# Patient Record
Sex: FEMALE | Race: WHITE | NOT HISPANIC OR LATINO | Employment: UNEMPLOYED | URBAN - NONMETROPOLITAN AREA
[De-identification: names, ages, dates, MRNs, and addresses within clinical notes are randomized per-mention and may not be internally consistent; named-entity substitution may affect disease eponyms.]

---

## 2018-02-16 ENCOUNTER — HOSPITAL ENCOUNTER (INPATIENT)
Facility: HOSPITAL | Age: 58
LOS: 7 days | Discharge: HOME | End: 2018-02-23
Attending: PSYCHIATRY & NEUROLOGY | Admitting: PSYCHIATRY & NEUROLOGY

## 2018-02-16 ENCOUNTER — HOSPITAL ENCOUNTER (EMERGENCY)
Facility: HOSPITAL | Age: 58
Discharge: STILL ADMITTED OR ADMINISTRATIVE | End: 2018-02-16
Attending: EMERGENCY MEDICINE

## 2018-02-16 VITALS
HEIGHT: 66 IN | BODY MASS INDEX: 36.96 KG/M2 | WEIGHT: 230 LBS | SYSTOLIC BLOOD PRESSURE: 128 MMHG | TEMPERATURE: 98.9 F | OXYGEN SATURATION: 99 % | DIASTOLIC BLOOD PRESSURE: 76 MMHG | RESPIRATION RATE: 16 BRPM | HEART RATE: 88 BPM

## 2018-02-16 DIAGNOSIS — F19.10 SUBSTANCE ABUSE (HCC): ICD-10-CM

## 2018-02-16 DIAGNOSIS — R45.851 DEPRESSION WITH SUICIDAL IDEATION: Primary | ICD-10-CM

## 2018-02-16 DIAGNOSIS — F32.A DEPRESSION WITH SUICIDAL IDEATION: Primary | ICD-10-CM

## 2018-02-16 LAB
6-ACETYL MORPHINE: NEGATIVE
ALBUMIN SERPL-MCNC: 4.7 G/DL (ref 3.5–5)
ALBUMIN/GLOB SERPL: 1.2 G/DL (ref 1.5–2.5)
ALP SERPL-CCNC: 83 U/L (ref 35–104)
ALT SERPL W P-5'-P-CCNC: 12 U/L (ref 10–36)
AMPHET+METHAMPHET UR QL: POSITIVE
ANION GAP SERPL CALCULATED.3IONS-SCNC: 5.2 MMOL/L (ref 3.6–11.2)
AST SERPL-CCNC: 17 U/L (ref 10–30)
B-HCG UR QL: NEGATIVE
BACTERIA UR QL AUTO: ABNORMAL /HPF
BARBITURATES UR QL SCN: NEGATIVE
BASOPHILS # BLD AUTO: 0.09 10*3/MM3 (ref 0–0.3)
BASOPHILS NFR BLD AUTO: 0.9 % (ref 0–2)
BENZODIAZ UR QL SCN: NEGATIVE
BILIRUB SERPL-MCNC: 0.3 MG/DL (ref 0.2–1.8)
BILIRUB UR QL STRIP: NEGATIVE
BUN BLD-MCNC: 15 MG/DL (ref 7–21)
BUN/CREAT SERPL: 13.5 (ref 7–25)
BUPRENORPHINE SERPL-MCNC: NEGATIVE NG/ML
CALCIUM SPEC-SCNC: 10.3 MG/DL (ref 7.7–10)
CANNABINOIDS SERPL QL: NEGATIVE
CHLORIDE SERPL-SCNC: 102 MMOL/L (ref 99–112)
CLARITY UR: CLEAR
CO2 SERPL-SCNC: 30.8 MMOL/L (ref 24.3–31.9)
COCAINE UR QL: NEGATIVE
COLOR UR: YELLOW
CREAT BLD-MCNC: 1.11 MG/DL (ref 0.43–1.29)
DEPRECATED RDW RBC AUTO: 42.2 FL (ref 37–54)
EOSINOPHIL # BLD AUTO: 0.47 10*3/MM3 (ref 0–0.7)
EOSINOPHIL NFR BLD AUTO: 4.6 % (ref 0–5)
ERYTHROCYTE [DISTWIDTH] IN BLOOD BY AUTOMATED COUNT: 13.7 % (ref 11.5–14.5)
ETHANOL BLD-MCNC: <10 MG/DL
ETHANOL UR QL: <0.01 %
GFR SERPL CREATININE-BSD FRML MDRD: 51 ML/MIN/1.73
GLOBULIN UR ELPH-MCNC: 4 GM/DL
GLUCOSE BLD-MCNC: 93 MG/DL (ref 70–110)
GLUCOSE UR STRIP-MCNC: NEGATIVE MG/DL
HCT VFR BLD AUTO: 40.2 % (ref 37–47)
HGB BLD-MCNC: 13.1 G/DL (ref 12–16)
HGB UR QL STRIP.AUTO: NEGATIVE
HYALINE CASTS UR QL AUTO: ABNORMAL /LPF
IMM GRANULOCYTES # BLD: 0.02 10*3/MM3 (ref 0–0.03)
IMM GRANULOCYTES NFR BLD: 0.2 % (ref 0–0.5)
KETONES UR QL STRIP: NEGATIVE
LEUKOCYTE ESTERASE UR QL STRIP.AUTO: ABNORMAL
LYMPHOCYTES # BLD AUTO: 3.05 10*3/MM3 (ref 1–3)
LYMPHOCYTES NFR BLD AUTO: 30 % (ref 21–51)
MAGNESIUM SERPL-MCNC: 2.1 MG/DL (ref 1.7–2.6)
MCH RBC QN AUTO: 28.1 PG (ref 27–33)
MCHC RBC AUTO-ENTMCNC: 32.6 G/DL (ref 33–37)
MCV RBC AUTO: 86.1 FL (ref 80–94)
METHADONE UR QL SCN: NEGATIVE
MONOCYTES # BLD AUTO: 0.76 10*3/MM3 (ref 0.1–0.9)
MONOCYTES NFR BLD AUTO: 7.5 % (ref 0–10)
NEUTROPHILS # BLD AUTO: 5.76 10*3/MM3 (ref 1.4–6.5)
NEUTROPHILS NFR BLD AUTO: 56.8 % (ref 30–70)
NITRITE UR QL STRIP: NEGATIVE
OPIATES UR QL: NEGATIVE
OSMOLALITY SERPL CALC.SUM OF ELEC: 276.2 MOSM/KG (ref 273–305)
OXYCODONE UR QL SCN: NEGATIVE
PCP UR QL SCN: NEGATIVE
PH UR STRIP.AUTO: 6 [PH] (ref 5–8)
PLATELET # BLD AUTO: 325 10*3/MM3 (ref 130–400)
PMV BLD AUTO: 8.8 FL (ref 6–10)
POTASSIUM BLD-SCNC: 4.6 MMOL/L (ref 3.5–5.3)
PROT SERPL-MCNC: 8.7 G/DL (ref 6–8)
PROT UR QL STRIP: NEGATIVE
RBC # BLD AUTO: 4.67 10*6/MM3 (ref 4.2–5.4)
RBC # UR: ABNORMAL /HPF
REF LAB TEST METHOD: ABNORMAL
SODIUM BLD-SCNC: 138 MMOL/L (ref 135–153)
SP GR UR STRIP: 1.01 (ref 1–1.03)
SQUAMOUS #/AREA URNS HPF: ABNORMAL /HPF
UROBILINOGEN UR QL STRIP: ABNORMAL
WBC NRBC COR # BLD: 10.15 10*3/MM3 (ref 4.5–12.5)
WBC UR QL AUTO: ABNORMAL /HPF

## 2018-02-16 PROCEDURE — 80307 DRUG TEST PRSMV CHEM ANLYZR: CPT | Performed by: PHYSICIAN ASSISTANT

## 2018-02-16 PROCEDURE — 81001 URINALYSIS AUTO W/SCOPE: CPT | Performed by: PHYSICIAN ASSISTANT

## 2018-02-16 PROCEDURE — 80053 COMPREHEN METABOLIC PANEL: CPT | Performed by: PHYSICIAN ASSISTANT

## 2018-02-16 PROCEDURE — 87086 URINE CULTURE/COLONY COUNT: CPT | Performed by: PHYSICIAN ASSISTANT

## 2018-02-16 PROCEDURE — 85025 COMPLETE CBC W/AUTO DIFF WBC: CPT | Performed by: PHYSICIAN ASSISTANT

## 2018-02-16 PROCEDURE — HZ2ZZZZ DETOXIFICATION SERVICES FOR SUBSTANCE ABUSE TREATMENT: ICD-10-PCS | Performed by: PSYCHIATRY & NEUROLOGY

## 2018-02-16 PROCEDURE — 83735 ASSAY OF MAGNESIUM: CPT | Performed by: PHYSICIAN ASSISTANT

## 2018-02-16 PROCEDURE — 81025 URINE PREGNANCY TEST: CPT | Performed by: PHYSICIAN ASSISTANT

## 2018-02-16 RX ORDER — FAMOTIDINE 20 MG/1
20 TABLET, FILM COATED ORAL 2 TIMES DAILY
Status: DISCONTINUED | OUTPATIENT
Start: 2018-02-16 | End: 2018-02-23 | Stop reason: HOSPADM

## 2018-02-16 RX ORDER — GABAPENTIN 400 MG/1
800 CAPSULE ORAL EVERY 6 HOURS SCHEDULED
Status: DISCONTINUED | OUTPATIENT
Start: 2018-02-16 | End: 2018-02-19

## 2018-02-16 RX ORDER — MULTIVITAMIN
1 TABLET ORAL DAILY
Status: CANCELLED | OUTPATIENT
Start: 2018-02-17

## 2018-02-16 RX ORDER — RANITIDINE 150 MG/1
300 TABLET ORAL 2 TIMES DAILY
COMMUNITY

## 2018-02-16 RX ORDER — IBUPROFEN 800 MG/1
800 TABLET ORAL EVERY 6 HOURS PRN
Status: DISCONTINUED | OUTPATIENT
Start: 2018-02-16 | End: 2018-02-23 | Stop reason: HOSPADM

## 2018-02-16 RX ORDER — IBUPROFEN 800 MG/1
800 TABLET ORAL EVERY 6 HOURS PRN
COMMUNITY
End: 2018-02-23 | Stop reason: HOSPADM

## 2018-02-16 RX ORDER — GABAPENTIN 800 MG/1
800 TABLET ORAL 4 TIMES DAILY
COMMUNITY
End: 2018-02-23 | Stop reason: HOSPADM

## 2018-02-16 RX ORDER — SERTRALINE HYDROCHLORIDE 100 MG/1
100 TABLET, FILM COATED ORAL DAILY
COMMUNITY
End: 2018-02-23 | Stop reason: HOSPADM

## 2018-02-16 RX ORDER — NICOTINE 21 MG/24HR
1 PATCH, TRANSDERMAL 24 HOURS TRANSDERMAL EVERY 24 HOURS
Status: CANCELLED | OUTPATIENT
Start: 2018-02-16

## 2018-02-16 RX ORDER — ONDANSETRON 4 MG/1
4 TABLET, FILM COATED ORAL EVERY 8 HOURS PRN
COMMUNITY
End: 2018-02-23 | Stop reason: HOSPADM

## 2018-02-16 RX ORDER — THIAMINE HCL 50 MG
100 TABLET ORAL DAILY
Status: CANCELLED | OUTPATIENT
Start: 2018-02-17

## 2018-02-16 RX ORDER — ONDANSETRON 4 MG/1
4 TABLET, FILM COATED ORAL EVERY 8 HOURS PRN
Status: DISCONTINUED | OUTPATIENT
Start: 2018-02-16 | End: 2018-02-23 | Stop reason: HOSPADM

## 2018-02-16 NOTE — DISCHARGE INSTRUCTIONS
"    Hypertension  Hypertension, commonly called high blood pressure, is when the force of blood pumping through the arteries is too strong. The arteries are the blood vessels that carry blood from the heart throughout the body. Hypertension forces the heart to work harder to pump blood and may cause arteries to become narrow or stiff. Having untreated or uncontrolled hypertension can cause heart attacks, strokes, kidney disease, and other problems.  A blood pressure reading consists of a higher number over a lower number. Ideally, your blood pressure should be below 120/80. The first (\"top\") number is called the systolic pressure. It is a measure of the pressure in your arteries as your heart beats. The second (\"bottom\") number is called the diastolic pressure. It is a measure of the pressure in your arteries as the heart relaxes.  What are the causes?  The cause of this condition is not known.  What increases the risk?  Some risk factors for high blood pressure are under your control. Others are not.  Factors you can change   · Smoking.  · Having type 2 diabetes mellitus, high cholesterol, or both.  · Not getting enough exercise or physical activity.  · Being overweight.  · Having too much fat, sugar, calories, or salt (sodium) in your diet.  · Drinking too much alcohol.  Factors that are difficult or impossible to change   · Having chronic kidney disease.  · Having a family history of high blood pressure.  · Age. Risk increases with age.  · Race. You may be at higher risk if you are -American.  · Gender. Men are at higher risk than women before age 45. After age 65, women are at higher risk than men.  · Having obstructive sleep apnea.  · Stress.  What are the signs or symptoms?  Extremely high blood pressure (hypertensive crisis) may cause:  · Headache.  · Anxiety.  · Shortness of breath.  · Nosebleed.  · Nausea and vomiting.  · Severe chest pain.  · Jerky movements you cannot control (seizures).  How is " this diagnosed?  This condition is diagnosed by measuring your blood pressure while you are seated, with your arm resting on a surface. The cuff of the blood pressure monitor will be placed directly against the skin of your upper arm at the level of your heart. It should be measured at least twice using the same arm. Certain conditions can cause a difference in blood pressure between your right and left arms.  Certain factors can cause blood pressure readings to be lower or higher than normal (elevated) for a short period of time:  · When your blood pressure is higher when you are in a health care provider's office than when you are at home, this is called white coat hypertension. Most people with this condition do not need medicines.  · When your blood pressure is higher at home than when you are in a health care provider's office, this is called masked hypertension. Most people with this condition may need medicines to control blood pressure.  If you have a high blood pressure reading during one visit or you have normal blood pressure with other risk factors:  · You may be asked to return on a different day to have your blood pressure checked again.  · You may be asked to monitor your blood pressure at home for 1 week or longer.  If you are diagnosed with hypertension, you may have other blood or imaging tests to help your health care provider understand your overall risk for other conditions.  How is this treated?  This condition is treated by making healthy lifestyle changes, such as eating healthy foods, exercising more, and reducing your alcohol intake. Your health care provider may prescribe medicine if lifestyle changes are not enough to get your blood pressure under control, and if:  · Your systolic blood pressure is above 130.  · Your diastolic blood pressure is above 80.  Your personal target blood pressure may vary depending on your medical conditions, your age, and other factors.  Follow these  instructions at home:  Eating and drinking   · Eat a diet that is high in fiber and potassium, and low in sodium, added sugar, and fat. An example eating plan is called the DASH (Dietary Approaches to Stop Hypertension) diet. To eat this way:  ¨ Eat plenty of fresh fruits and vegetables. Try to fill half of your plate at each meal with fruits and vegetables.  ¨ Eat whole grains, such as whole wheat pasta, brown rice, or whole grain bread. Fill about one quarter of your plate with whole grains.  ¨ Eat or drink low-fat dairy products, such as skim milk or low-fat yogurt.  ¨ Avoid fatty cuts of meat, processed or cured meats, and poultry with skin. Fill about one quarter of your plate with lean proteins, such as fish, chicken without skin, beans, eggs, and tofu.  ¨ Avoid premade and processed foods. These tend to be higher in sodium, added sugar, and fat.  · Reduce your daily sodium intake. Most people with hypertension should eat less than 1,500 mg of sodium a day.  · Limit alcohol intake to no more than 1 drink a day for nonpregnant women and 2 drinks a day for men. One drink equals 12 oz of beer, 5 oz of wine, or 1½ oz of hard liquor.  Lifestyle   · Work with your health care provider to maintain a healthy body weight or to lose weight. Ask what an ideal weight is for you.  · Get at least 30 minutes of exercise that causes your heart to beat faster (aerobic exercise) most days of the week. Activities may include walking, swimming, or biking.  · Include exercise to strengthen your muscles (resistance exercise), such as pilates or lifting weights, as part of your weekly exercise routine. Try to do these types of exercises for 30 minutes at least 3 days a week.  · Do not use any products that contain nicotine or tobacco, such as cigarettes and e-cigarettes. If you need help quitting, ask your health care provider.  · Monitor your blood pressure at home as told by your health care provider.  · Keep all follow-up visits  as told by your health care provider. This is important.  Medicines   · Take over-the-counter and prescription medicines only as told by your health care provider. Follow directions carefully. Blood pressure medicines must be taken as prescribed.  · Do not skip doses of blood pressure medicine. Doing this puts you at risk for problems and can make the medicine less effective.  · Ask your health care provider about side effects or reactions to medicines that you should watch for.  Contact a health care provider if:  · You think you are having a reaction to a medicine you are taking.  · You have headaches that keep coming back (recurring).  · You feel dizzy.  · You have swelling in your ankles.  · You have trouble with your vision.  Get help right away if:  · You develop a severe headache or confusion.  · You have unusual weakness or numbness.  · You feel faint.  · You have severe pain in your chest or abdomen.  · You vomit repeatedly.  · You have trouble breathing.  Summary  · Hypertension is when the force of blood pumping through your arteries is too strong. If this condition is not controlled, it may put you at risk for serious complications.  · Your personal target blood pressure may vary depending on your medical conditions, your age, and other factors. For most people, a normal blood pressure is less than 120/80.  · Hypertension is treated with lifestyle changes, medicines, or a combination of both. Lifestyle changes include weight loss, eating a healthy, low-sodium diet, exercising more, and limiting alcohol.  This information is not intended to replace advice given to you by your health care provider. Make sure you discuss any questions you have with your health care provider.  Document Released: 12/18/2006 Document Revised: 11/15/2017 Document Reviewed: 11/15/2017  [employer] Interactive Patient Education © 2017 [employer]

## 2018-02-16 NOTE — ED PROVIDER NOTES
Subjective   Patient is a 57 y.o. female presenting with mental health disorder.   Mental Health Problem   Presenting symptoms: depression and suicidal thoughts    Degree of incapacity (severity):  Severe  Onset quality:  Gradual  Duration:  2 months  Timing:  Constant  Progression:  Worsening  Chronicity:  Recurrent  Context: alcohol use and drug abuse (IV methamphetamine and cocaine)    Relieved by:  Nothing  Worsened by:  Nothing  Associated symptoms: anxiety and feelings of worthlessness    Associated symptoms: no abdominal pain and no chest pain        Review of Systems   Constitutional: Negative.  Negative for fever.   HENT: Negative.    Respiratory: Negative.    Cardiovascular: Negative.  Negative for chest pain.   Gastrointestinal: Negative.  Negative for abdominal pain.   Endocrine: Negative.    Genitourinary: Negative.  Negative for dysuria.   Skin: Negative.    Neurological: Negative.    Psychiatric/Behavioral: Positive for suicidal ideas. The patient is nervous/anxious.    All other systems reviewed and are negative.      Past Medical History:   Diagnosis Date   • Alcoholism    • Anxiety    • Depression    • Substance abuse        No Known Allergies    Past Surgical History:   Procedure Laterality Date   • BACK SURGERY         Family History   Problem Relation Age of Onset   • Schizophrenia Sister        Social History     Social History   • Marital status:      Spouse name: N/A   • Number of children: N/A   • Years of education: N/A     Social History Main Topics   • Smoking status: Never Smoker   • Smokeless tobacco: Never Used   • Alcohol use Yes      Comment: 5th to half of gallon daily.Reports that she may drink every day for 2 weeks and then not drink for a month   • Drug use: Yes     Special: Hydrocodone, Methamphetamines, Cocaine   • Sexual activity: Defer     Other Topics Concern   • None     Social History Narrative   • None           Objective   Physical Exam   Constitutional: She is  oriented to person, place, and time. She appears well-developed and well-nourished. No distress.   HENT:   Head: Normocephalic and atraumatic.   Right Ear: External ear normal.   Left Ear: External ear normal.   Nose: Nose normal.   Eyes: Conjunctivae and EOM are normal. Pupils are equal, round, and reactive to light.   Neck: Normal range of motion. Neck supple. No JVD present. No tracheal deviation present.   Cardiovascular: Normal rate, regular rhythm and normal heart sounds.    No murmur heard.  Pulmonary/Chest: Effort normal and breath sounds normal. No respiratory distress. She has no wheezes.   Abdominal: Soft. Bowel sounds are normal. There is no tenderness.   Musculoskeletal: Normal range of motion. She exhibits no edema or deformity.   Neurological: She is alert and oriented to person, place, and time. No cranial nerve deficit.   Skin: Skin is warm and dry. No rash noted. She is not diaphoretic. No erythema. No pallor.   Psychiatric: She has a normal mood and affect. Her behavior is normal. Thought content normal.   Nursing note and vitals reviewed.      Procedures         ED Course  ED Course                  MDM  Number of Diagnoses or Management Options  Depression with suicidal ideation: established and worsening      Final diagnoses:   Depression with suicidal ideation   Substance abuse            CARLTON Leggett  02/16/18 1814       CARLTON Leggett  02/16/18 1957

## 2018-02-17 PROBLEM — R45.851 SUICIDAL IDEATIONS: Status: ACTIVE | Noted: 2018-02-17

## 2018-02-17 LAB
HAV IGM SERPL QL IA: ABNORMAL
HBV CORE IGM SERPL QL IA: ABNORMAL
HBV SURFACE AG SERPL QL IA: ABNORMAL
HCV AB SER DONR QL: REACTIVE

## 2018-02-17 PROCEDURE — 93010 ELECTROCARDIOGRAM REPORT: CPT | Performed by: INTERNAL MEDICINE

## 2018-02-17 PROCEDURE — 80074 ACUTE HEPATITIS PANEL: CPT | Performed by: PSYCHIATRY & NEUROLOGY

## 2018-02-17 PROCEDURE — G0008 ADMIN INFLUENZA VIRUS VAC: HCPCS | Performed by: PSYCHIATRY & NEUROLOGY

## 2018-02-17 PROCEDURE — 25010000002 INFLUENZA VAC SPLIT QUAD 0.5 ML SUSPENSION PREFILLED SYRINGE: Performed by: PSYCHIATRY & NEUROLOGY

## 2018-02-17 PROCEDURE — 90686 IIV4 VACC NO PRSV 0.5 ML IM: CPT | Performed by: PSYCHIATRY & NEUROLOGY

## 2018-02-17 PROCEDURE — 99221 1ST HOSP IP/OBS SF/LOW 40: CPT | Performed by: PSYCHIATRY & NEUROLOGY

## 2018-02-17 PROCEDURE — 93005 ELECTROCARDIOGRAM TRACING: CPT | Performed by: PSYCHIATRY & NEUROLOGY

## 2018-02-17 RX ORDER — ALUMINA, MAGNESIA, AND SIMETHICONE 2400; 2400; 240 MG/30ML; MG/30ML; MG/30ML
15 SUSPENSION ORAL EVERY 6 HOURS PRN
Status: DISCONTINUED | OUTPATIENT
Start: 2018-02-17 | End: 2018-02-23 | Stop reason: HOSPADM

## 2018-02-17 RX ORDER — LOPERAMIDE HYDROCHLORIDE 2 MG/1
2 CAPSULE ORAL 4 TIMES DAILY PRN
Status: DISCONTINUED | OUTPATIENT
Start: 2018-02-17 | End: 2018-02-23 | Stop reason: HOSPADM

## 2018-02-17 RX ORDER — ACETAMINOPHEN 160 MG/5ML
650 SOLUTION ORAL EVERY 4 HOURS PRN
Status: DISCONTINUED | OUTPATIENT
Start: 2018-02-17 | End: 2018-02-23 | Stop reason: HOSPADM

## 2018-02-17 RX ORDER — TRAZODONE HYDROCHLORIDE 50 MG/1
50 TABLET ORAL NIGHTLY PRN
Status: DISCONTINUED | OUTPATIENT
Start: 2018-02-17 | End: 2018-02-23 | Stop reason: HOSPADM

## 2018-02-17 RX ORDER — BENZTROPINE MESYLATE 1 MG/ML
0.5 INJECTION INTRAMUSCULAR; INTRAVENOUS DAILY PRN
Status: DISCONTINUED | OUTPATIENT
Start: 2018-02-17 | End: 2018-02-23 | Stop reason: HOSPADM

## 2018-02-17 RX ORDER — FAMOTIDINE 20 MG/1
20 TABLET, FILM COATED ORAL 2 TIMES DAILY PRN
Status: DISCONTINUED | OUTPATIENT
Start: 2018-02-17 | End: 2018-02-23 | Stop reason: HOSPADM

## 2018-02-17 RX ORDER — ECHINACEA PURPUREA EXTRACT 125 MG
2 TABLET ORAL AS NEEDED
Status: DISCONTINUED | OUTPATIENT
Start: 2018-02-17 | End: 2018-02-23 | Stop reason: HOSPADM

## 2018-02-17 RX ORDER — HYDROXYZINE 50 MG/1
50 TABLET, FILM COATED ORAL EVERY 6 HOURS PRN
Status: DISCONTINUED | OUTPATIENT
Start: 2018-02-17 | End: 2018-02-23 | Stop reason: HOSPADM

## 2018-02-17 RX ORDER — NITROFURANTOIN MACROCRYSTALS 50 MG/1
100 CAPSULE ORAL 2 TIMES DAILY
Status: DISCONTINUED | OUTPATIENT
Start: 2018-02-17 | End: 2018-02-23 | Stop reason: HOSPADM

## 2018-02-17 RX ORDER — ONDANSETRON 4 MG/1
4 TABLET, FILM COATED ORAL EVERY 6 HOURS PRN
Status: DISCONTINUED | OUTPATIENT
Start: 2018-02-17 | End: 2018-02-23 | Stop reason: HOSPADM

## 2018-02-17 RX ORDER — BENZTROPINE MESYLATE 1 MG/1
1 TABLET ORAL DAILY PRN
Status: DISCONTINUED | OUTPATIENT
Start: 2018-02-17 | End: 2018-02-23 | Stop reason: HOSPADM

## 2018-02-17 RX ORDER — BENZONATATE 100 MG/1
100 CAPSULE ORAL 3 TIMES DAILY PRN
Status: DISCONTINUED | OUTPATIENT
Start: 2018-02-17 | End: 2018-02-23 | Stop reason: HOSPADM

## 2018-02-17 RX ADMIN — IBUPROFEN 800 MG: 800 TABLET ORAL at 17:55

## 2018-02-17 RX ADMIN — NITROFURANTOIN MACROCRYSTALS 100 MG: 50 CAPSULE ORAL at 21:15

## 2018-02-17 RX ADMIN — INFLUENZA VIRUS VACCINE 0.5 ML: 15; 15; 15; 15 SUSPENSION INTRAMUSCULAR at 11:47

## 2018-02-17 RX ADMIN — GABAPENTIN 800 MG: 400 CAPSULE ORAL at 06:27

## 2018-02-17 RX ADMIN — SERTRALINE 100 MG: 50 TABLET, FILM COATED ORAL at 08:58

## 2018-02-17 RX ADMIN — NITROFURANTOIN MACROCRYSTALS 100 MG: 50 CAPSULE ORAL at 11:47

## 2018-02-17 RX ADMIN — FAMOTIDINE 20 MG: 20 TABLET, FILM COATED ORAL at 08:58

## 2018-02-17 RX ADMIN — GABAPENTIN 800 MG: 400 CAPSULE ORAL at 17:55

## 2018-02-17 RX ADMIN — GABAPENTIN 800 MG: 400 CAPSULE ORAL at 22:27

## 2018-02-17 RX ADMIN — FAMOTIDINE 20 MG: 20 TABLET, FILM COATED ORAL at 21:15

## 2018-02-17 RX ADMIN — GABAPENTIN 800 MG: 400 CAPSULE ORAL at 11:47
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
## 2018-02-17 NOTE — PLAN OF CARE
Problem: BH Patient Care Overview (Adult)  Goal: Plan of Care Review  Outcome: Ongoing (interventions implemented as appropriate)    Goal: Interdisciplinary Rounds/Family Conference  Outcome: Ongoing (interventions implemented as appropriate)    Goal: Individualization and Mutuality  Outcome: Ongoing (interventions implemented as appropriate)    Goal: Discharge Needs Assessment  Outcome: Ongoing (interventions implemented as appropriate)      Problem:  Overarching Goals  Goal: Adheres to Safety Considerations for Self and Others  Outcome: Ongoing (interventions implemented as appropriate)    Goal: Optimized Coping Skills in Response to Life Stressors  Outcome: Ongoing (interventions implemented as appropriate)    Goal: Develops/Participates in Therapeutic [place] to Support Successful Transition  Outcome: Ongoing (interventions implemented as appropriate)      Problem: Risk for Self Injury/Neglect  Goal: Refrain from harming self  Outcome: Ongoing (interventions implemented as appropriate)

## 2018-02-17 NOTE — NURSING NOTE
Presented clinicals Dr. Chavez. Orders for admission with routine orders and SP3 precautions. Orders read back and verified.

## 2018-02-17 NOTE — PLAN OF CARE
Problem:  Patient Care Overview (Adult)  Goal: Plan of Care Review   02/17/18 0310   Coping/Psychosocial Response Interventions   Plan Of Care Reviewed With patient   Coping/Psychosocial   Patient Agreement with Plan of Care agrees   Patient Care Overview   Progress no change   Outcome Evaluation   Outcome Summary/Follow up Plan pt presented to floor, depressed and sad affect, pt went to bed, in room resting

## 2018-02-18 PROCEDURE — 99231 SBSQ HOSP IP/OBS SF/LOW 25: CPT | Performed by: PSYCHIATRY & NEUROLOGY

## 2018-02-18 RX ADMIN — GABAPENTIN 800 MG: 400 CAPSULE ORAL at 23:22

## 2018-02-18 RX ADMIN — SERTRALINE 100 MG: 50 TABLET, FILM COATED ORAL at 08:30

## 2018-02-18 RX ADMIN — NITROFURANTOIN MACROCRYSTALS 100 MG: 50 CAPSULE ORAL at 08:30

## 2018-02-18 RX ADMIN — GABAPENTIN 800 MG: 400 CAPSULE ORAL at 17:09

## 2018-02-18 RX ADMIN — FAMOTIDINE 20 MG: 20 TABLET, FILM COATED ORAL at 08:30

## 2018-02-18 RX ADMIN — GABAPENTIN 800 MG: 400 CAPSULE ORAL at 11:51

## 2018-02-18 RX ADMIN — TRAZODONE HYDROCHLORIDE 50 MG: 50 TABLET ORAL at 21:01

## 2018-02-18 RX ADMIN — FAMOTIDINE 20 MG: 20 TABLET, FILM COATED ORAL at 21:00

## 2018-02-18 RX ADMIN — IBUPROFEN 800 MG: 800 TABLET ORAL at 08:30

## 2018-02-18 RX ADMIN — IBUPROFEN 800 MG: 800 TABLET ORAL at 21:01

## 2018-02-18 RX ADMIN — NITROFURANTOIN MACROCRYSTALS 100 MG: 50 CAPSULE ORAL at 21:00

## 2018-02-18 RX ADMIN — GABAPENTIN 800 MG: 400 CAPSULE ORAL at 06:12

## 2018-02-18 NOTE — PLAN OF CARE
Problem: BH Patient Care Overview (Adult)  Goal: Plan of Care Review  Outcome: Ongoing (interventions implemented as appropriate)  Rates anxiety 8 and depression 8. Denies suicidal thoughts. Reports hearing voices.   Goal: Interdisciplinary Rounds/Family Conference  Outcome: Ongoing (interventions implemented as appropriate)    Goal: Individualization and Mutuality  Outcome: Ongoing (interventions implemented as appropriate)    Goal: Discharge Needs Assessment  Outcome: Ongoing (interventions implemented as appropriate)      Problem:  Overarching Goals  Goal: Adheres to Safety Considerations for Self and Others  Outcome: Ongoing (interventions implemented as appropriate)    Goal: Optimized Coping Skills in Response to Life Stressors  Outcome: Ongoing (interventions implemented as appropriate)    Goal: Develops/Participates in Therapeutic [place] to Support Successful Transition  Outcome: Ongoing (interventions implemented as appropriate)      Problem: Risk for Self Injury/Neglect  Goal: Refrain from harming self  Outcome: Ongoing (interventions implemented as appropriate)

## 2018-02-18 NOTE — PLAN OF CARE
Problem: BH Patient Care Overview (Adult)  Goal: Plan of Care Review  Outcome: Ongoing (interventions implemented as appropriate)   02/18/18 0649   Coping/Psychosocial Response Interventions   Plan Of Care Reviewed With patient   Coping/Psychosocial   Patient Agreement with Plan of Care agrees   Patient Care Overview   Progress no change       Problem:  Overarching Goals  Goal: Adheres to Safety Considerations for Self and Others  Outcome: Ongoing (interventions implemented as appropriate)    Goal: Optimized Coping Skills in Response to Life Stressors  Outcome: Ongoing (interventions implemented as appropriate)    Goal: Develops/Participates in Therapeutic [place] to Support Successful Transition  Outcome: Ongoing (interventions implemented as appropriate)

## 2018-02-19 PROBLEM — F10.20 ALCOHOLISM (HCC): Status: ACTIVE | Noted: 2018-02-19

## 2018-02-19 PROBLEM — F33.2 SEVERE EPISODE OF RECURRENT MAJOR DEPRESSIVE DISORDER, WITHOUT PSYCHOTIC FEATURES (HCC): Status: ACTIVE | Noted: 2018-02-19

## 2018-02-19 PROBLEM — G89.4 CHRONIC PAIN DISORDER: Status: ACTIVE | Noted: 2018-02-19

## 2018-02-19 PROBLEM — B18.2 HEP C W/O COMA, CHRONIC (HCC): Status: ACTIVE | Noted: 2018-02-19

## 2018-02-19 LAB — BACTERIA SPEC AEROBE CULT: NORMAL

## 2018-02-19 PROCEDURE — 99232 SBSQ HOSP IP/OBS MODERATE 35: CPT | Performed by: PSYCHIATRY & NEUROLOGY

## 2018-02-19 RX ORDER — GABAPENTIN 400 MG/1
800 CAPSULE ORAL 3 TIMES DAILY
Status: DISCONTINUED | OUTPATIENT
Start: 2018-02-19 | End: 2018-02-20

## 2018-02-19 RX ORDER — PANTOPRAZOLE SODIUM 40 MG/1
40 TABLET, DELAYED RELEASE ORAL
Status: DISCONTINUED | OUTPATIENT
Start: 2018-02-19 | End: 2018-02-23 | Stop reason: HOSPADM

## 2018-02-19 RX ADMIN — SERTRALINE 150 MG: 50 TABLET, FILM COATED ORAL at 09:56

## 2018-02-19 RX ADMIN — NITROFURANTOIN MACROCRYSTALS 100 MG: 50 CAPSULE ORAL at 09:56

## 2018-02-19 RX ADMIN — PANTOPRAZOLE SODIUM 40 MG: 40 TABLET, DELAYED RELEASE ORAL at 09:57

## 2018-02-19 RX ADMIN — FAMOTIDINE 20 MG: 20 TABLET, FILM COATED ORAL at 09:56

## 2018-02-19 RX ADMIN — FAMOTIDINE 20 MG: 20 TABLET, FILM COATED ORAL at 20:43

## 2018-02-19 RX ADMIN — GABAPENTIN 800 MG: 400 CAPSULE ORAL at 20:43

## 2018-02-19 RX ADMIN — GABAPENTIN 800 MG: 400 CAPSULE ORAL at 15:49

## 2018-02-19 RX ADMIN — IBUPROFEN 800 MG: 800 TABLET ORAL at 14:11

## 2018-02-19 RX ADMIN — HYDROXYZINE HYDROCHLORIDE 50 MG: 50 TABLET ORAL at 14:12

## 2018-02-19 RX ADMIN — GABAPENTIN 800 MG: 400 CAPSULE ORAL at 06:08

## 2018-02-19 RX ADMIN — NITROFURANTOIN MACROCRYSTALS 100 MG: 50 CAPSULE ORAL at 20:43

## 2018-02-19 NOTE — PROGRESS NOTES
"INPATIENT PSYCHIATRIC PROGRESS NOTE    Name:  Brittaney Park  :  1960  MRN:  [id number]  Visit Number:  [id number]  Length of stay:  3    Behavioral Health Treatment Plan and Problem List: I have reviewed and approved the Behavioral Health Treatment Plan and Problem list.    SUBJECTIVE  CC: \"Some better\"     INTERVAL HISTORY: remains depressed but with a trace of hope. No significant withdrawal symptoms although has been on 800 mg of Neurontin QID.     Had 2 years of sobriety and \"feeling good\" while living on the [facility] [id number]. Relapse when she returned to [facility] and living with her daughter. \"Got stressed\". Feels that a religous based residential recovery program would be helpful.     Depression rating 8/10  Anxiety rating 8/10  Sleep: 7-8 hours    Cravin-9/10, would free her from her worries.        Review of Systems   Respiratory: Negative.    Cardiovascular: Negative.    Gastrointestinal:        GERD   Musculoskeletal:        Chronic pain right leg and hip   Neurological: Negative.          OBJECTIVE    Temp:  [97.1 °F (36.2 °C)-97.9 °F (36.6 °C)] 97.1 °F (36.2 °C)  Heart Rate:  [67-76] 76  Resp:  [18] 18  BP: (144-151)/(76-81) 144/81    MENTAL STATUS EXAM:      Appearance:Casually dressed, good hygeine.   Cooperation:Cooperative  Psychomotor: No psychomotor agitation/retardation, No EPS, No motor tics  Speech-normal rate, amount.   Mood/Affect: Depressed  Thought Processes: associations intact  Thought Content: negativistic   Hallucination(s): none  Hopelessness: No  Optimistic:minimally  Suicidal Thoughts:  none  Suicidal Plan/Intent: none  Homicidal Thoughts:  absent  Orientation: oriented x 3  Memory: recent intact    Lab Results (last 24 hours)     ** No results found for the last 24 hours. **           Imaging Results (last 24 hours)     ** No results found for the last 24 hours. **           ECG/EMG Results (most recent)     Procedure Component Value Units " Date/Time    ECG 12 Lead [[id number]] Collected:  02/17/18 1343     Updated:  02/17/18 1344    Narrative:       Test Reason : Potential adverse reaction to medications.  Blood Pressure : **/** mmHG  Vent. Rate : 067 BPM     Atrial Rate : 067 BPM     P-R Int : 130 ms          QRS Dur : 076 ms      QT Int : 420 ms       P-R-T Axes : 036 -01 053 degrees     QTc Int : 443 ms    Normal sinus rhythm  Normal ECG  No previous ECGs available    Referred By:  LAURA           Confirmed By:            ALLERGIES: Review of patient's allergies indicates no known allergies.      Current Facility-Administered Medications:   •  acetaminophen (TYLENOL) 160 MG/5ML solution 650 mg, 650 mg, Oral, Q4H PRN, Alysha Chavez MD  •  aluminum-magnesium hydroxide-simethicone (MAALOX MAX) 400-400-40 MG/5ML suspension 15 mL, 15 mL, Oral, Q6H PRN, Alysha Chavez MD  •  benzonatate (TESSALON) capsule 100 mg, 100 mg, Oral, TID PRN, Alysha Chavez MD  •  benztropine (COGENTIN) tablet 1 mg, 1 mg, Oral, Daily PRN **OR** benztropine (COGENTIN) injection 0.5 mg, 0.5 mg, Intramuscular, Daily PRN, Alysha Chavez MD  •  famotidine (PEPCID) tablet 20 mg, 20 mg, Oral, BID PRN, Alysha Chavez MD  •  famotidine (PEPCID) tablet 20 mg, 20 mg, Oral, BID, Alysha Chavez MD, 20 mg at 02/18/18 2100  •  gabapentin (NEURONTIN) capsule 800 mg, 800 mg, Oral, Q6H, Alysha Chavez MD, 800 mg at 02/19/18 0608  •  hydrOXYzine (ATARAX) tablet 50 mg, 50 mg, Oral, Q6H PRN, Alysha Chavez MD  •  ibuprofen (ADVIL,MOTRIN) tablet 800 mg, 800 mg, Oral, Q6H PRN, Alysha Chavez MD, 800 mg at 02/18/18 2101  •  loperamide (IMODIUM) capsule 2 mg, 2 mg, Oral, 4x Daily PRN, Alysha Chavez MD  •  magnesium hydroxide (MILK OF MAGNESIA) suspension 2400 mg/10mL 10 mL, 10 mL, Oral, Daily PRN, Alysha Chavez MD  •  nitrofurantoin (MACRODANTIN) capsule 100 mg, 100 mg, Oral, BID, Alysha Chavez MD, 100 mg at 02/18/18 2100  •  ondansetron (ZOFRAN) tablet 4 mg, 4 mg, Oral, Q6H PRN, Alysha Chavez MD  •  ondansetron (ZOFRAN)  tablet 4 mg, 4 mg, Oral, Q8H PRN, Alysha Chavez MD  •  sertraline (ZOLOFT) tablet 100 mg, 100 mg, Oral, Daily, Alysha Chavez MD, 100 mg at 02/18/18 0830  •  sodium chloride (OCEAN) nasal spray 2 spray, 2 spray, Each Nare, PRN, Alysha Chavez MD  •  traZODone (DESYREL) tablet 50 mg, 50 mg, Oral, Nightly PRN, Alysha Chavez MD, 50 mg at 02/18/18 2101    ASSESSMENT & PLAN    MDD, recurrent, severe, without psychosis  -continue Zoloft 100mg QD, encouraged compliance, SP3 precautions, work on safety planning.       Polysubstance use disorder  -Encourage abstinence, comfort medications as required. Encouraged patient to discuss rehab treatment options with therapist.       UTI   -Macrobid 100mg BID for 7 days      Hepatitis C  -Patient stated she is aware of this diagnosis, will need follow up outpatient.       Neuropathy  -Continue home gabapentin, per pharmacy [facility] appropriate       GERD  -continue home pepcid      Suicide precautions: Suicide precaution Level 3 (q15 min checks)     Behavioral Health Treatment Plan and Problem List: I have reviewed and approved the Behavioral Health Treatment Plan and Problem list.    Clinician:  Delano Mckee MD  02/19/18  8:35 AM

## 2018-02-19 NOTE — PLAN OF CARE
Problem: BH Patient Care Overview (Adult)  Goal: Plan of Care Review  Outcome: Ongoing (interventions implemented as appropriate)  Pt. Slept 6 hours rates dep 6 anx.5 she verbalizes hearing voices putting her down laughing @ her she verbalizes  craving alcohol 8 minimal interaction noted with peers cooperative manner   02/19/18 9826   Coping/Psychosocial Response Interventions   Plan Of Care Reviewed With patient   Coping/Psychosocial   Patient Agreement with Plan of Care agrees   Patient Care Overview   Progress improving       Problem:  Overarching Goals  Goal: Adheres to Safety Considerations for Self and Others  Outcome: Ongoing (interventions implemented as appropriate)    Goal: Optimized Coping Skills in Response to Life Stressors  Outcome: Ongoing (interventions implemented as appropriate)    Goal: Develops/Participates in Therapeutic [place] to Support Successful Transition  Outcome: Ongoing (interventions implemented as appropriate)

## 2018-02-19 NOTE — PLAN OF CARE
Problem: BH Patient Care Overview (Adult)  Goal: Plan of Care Review  Outcome: Ongoing (interventions implemented as appropriate)  PATIENT VERBALIZES ANXIETY, DEPRESSION, AUDITORY HALLUCINATIONS AND PAIN. NO NEW ORDERS NOTED.  Goal: Interdisciplinary Rounds/Family Conference  Outcome: Ongoing (interventions implemented as appropriate)    Goal: Individualization and Mutuality  Outcome: Ongoing (interventions implemented as appropriate)    Goal: Discharge Needs Assessment  Outcome: Ongoing (interventions implemented as appropriate)      Problem:  Overarching Goals  Goal: Adheres to Safety Considerations for Self and Others  Outcome: Ongoing (interventions implemented as appropriate)    Goal: Optimized Coping Skills in Response to Life Stressors  Outcome: Ongoing (interventions implemented as appropriate)    Goal: Develops/Participates in Therapeutic [place] to Support Successful Transition  Outcome: Ongoing (interventions implemented as appropriate)      Problem: SUBSTANCE USE/ABUSE  Goal: By discharge, will develop insight into their chemical dependency and sustain motivation to continue in recovery.  Outcome: Ongoing (interventions implemented as appropriate)    Goal: By discharge, will have in place an ongoing treatment plan in collaboration with assigned CDP.  Outcome: Ongoing (interventions implemented as appropriate)
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
## 2018-02-19 NOTE — DISCHARGE PLACEMENT REQUEST
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 (ADVIL,MOTRIN) tablet 800 mg, 800 mg, Oral, Q6H PRN, Alysha Chavez MD, 800 mg at 02/18/18 2101  •  loperamide (IMODIUM) capsule 2 mg, 2 mg, Oral, 4x Daily PRN, Alysha Chavez MD  •  magnesium hydroxide (MILK OF MAGNESIA) suspension 2400 mg/10mL 10 mL, 10 mL, Oral, Daily PRN, Alysha Chavez MD  •  nitrofurantoin (MACRODANTIN) capsule 100 mg, 100 mg, Oral, BID, Alysha Chavez MD, 100 mg at 02/18/18 2100  •  ondansetron (ZOFRAN) tablet 4 mg, 4 mg, Oral, Q6H PRN, Alysha Chavez MD  •  ondansetron (ZOFRAN) tablet 4 mg, 4 mg, Oral, Q8H PRN, Alysha Chavez MD  •  sertraline (ZOLOFT) tablet 100 mg, 100 mg, Oral, Daily, Alysha Chavez MD, 100 mg at 02/18/18 0830  •  sodium chloride (OCEAN) nasal spray 2 spray, 2 spray, Each Nare, PRN, Alysha Chavez MD  •  traZODone (DESYREL) tablet 50 mg, 50 mg, Oral, Nightly PRN, Alysha Chavez MD, 50 mg at 02/18/18 2101    ASSESSMENT & PLAN    MDD, recurrent, severe, without psychosis  -continue Zoloft 100mg QD, encouraged compliance, SP3 precautions, work on safety planning.       Polysubstance use disorder  -Encourage abstinence, comfort medications as required. Encouraged patient to discuss rehab treatment options with therapist.       UTI   -Macrobid 100mg BID for 7 days      Hepatitis C  -Patient stated she is aware of  "this diagnosis, will need follow up outpatient.       Neuropathy  -Continue home gabapentin, per pharmacy [facility] appropriate       GERD  -continue home pepcid      Suicide precautions: Suicide precaution Level 3 (q15 min checks)     Behavioral Health Treatment Plan and Problem List: I have reviewed and approved the Behavioral Health Treatment Plan and Problem list.    Clinician:  Delano Mckee MD  18  8:35 AM     Electronically signed by Delano Mckee MD at 2018  1:24 PM      Delano Mckee MD at 2018  8:35 AM  Version 1 of 2         INPATIENT PSYCHIATRIC PROGRESS NOTE    Name:  Brittaney Park  :  1960  MRN:  [id number]  Visit Number:  [id number]  Length of stay:  3    Behavioral Health Treatment Plan and Problem List: I have reviewed and approved the Behavioral Health Treatment Plan and Problem list.    SUBJECTIVE  CC: \"Some better\"     INTERVAL HISTORY: remains depressed but with a trace of hope. No significant withdrawal symptoms although has been on 800 mg of Neurontin QID.     Had 2 years of sobriety and \"feeling good\" will living on the [facility] [id number]. Relapse when she returned to [facility] and living with her daughter. \"Got stressed\". Feels that a religous based residential recovery program would be helpful.     Depression rating 8/10  Anxiety rating 8/10  Sleep: 7-8 hours    Cravin-9/10, would free her from her worries.        Review of Systems   Respiratory: Negative.    Cardiovascular: Negative.    Gastrointestinal:        GERD   Musculoskeletal:        Chronic pain right leg and hip   Neurological: Negative.          OBJECTIVE    Temp:  [97.1 °F (36.2 °C)-97.9 °F (36.6 °C)] 97.1 °F (36.2 °C)  Heart Rate:  [67-76] 76  Resp:  [18] 18  BP: (144-151)/(76-81) 144/81    MENTAL STATUS EXAM:      Appearance:Casually dressed, good hygeine.   Cooperation:Cooperative  Psychomotor: No psychomotor agitation/retardation, No EPS, No " motor tics  Speech-normal rate, amount.   Mood/Affect: Depressed  Thought Processes: associations intact  Thought Content: negativistic   Hallucination(s): none  Hopelessness: No  Optimistic:minimally  Suicidal Thoughts:  none  Suicidal Plan/Intent: none  Homicidal Thoughts:  absent  Orientation: oriented x 3  Memory: recent intact    Lab Results (last 24 hours)     ** No results found for the last 24 hours. **           Imaging Results (last 24 hours)     ** No results found for the last 24 hours. **           ECG/EMG Results (most recent)     Procedure Component Value Units Date/Time    ECG 12 Lead [[id number]] Collected:  02/17/18 1343     Updated:  02/17/18 1344    Narrative:       Test Reason : Potential adverse reaction to medications.  Blood Pressure : **/** mmHG  Vent. Rate : 067 BPM     Atrial Rate : 067 BPM     P-R Int : 130 ms          QRS Dur : 076 ms      QT Int : 420 ms       P-R-T Axes : 036 -01 053 degrees     QTc Int : 443 ms    Normal sinus rhythm  Normal ECG  No previous ECGs available    Referred By:  LAURA           Confirmed By:            ALLERGIES: Review of patient's allergies indicates no known allergies.      Current Facility-Administered Medications:   •  acetaminophen (TYLENOL) 160 MG/5ML solution 650 mg, 650 mg, Oral, Q4H PRN, Alysha Chavez MD  •  aluminum-magnesium hydroxide-simethicone (MAALOX MAX) 400-400-40 MG/5ML suspension 15 mL, 15 mL, Oral, Q6H PRN, Alysha Chavez MD  •  benzonatate (TESSALON) capsule 100 mg, 100 mg, Oral, TID PRN, Alysha Chavez MD  •  benztropine (COGENTIN) tablet 1 mg, 1 mg, Oral, Daily PRN **OR** benztropine (COGENTIN) injection 0.5 mg, 0.5 mg, Intramuscular, Daily PRN, Alysha Chavez MD  •  famotidine (PEPCID) tablet 20 mg, 20 mg, Oral, BID PRN, Alysha Chavez MD  •  famotidine (PEPCID) tablet 20 mg, 20 mg, Oral, BID, Alysha Chavez MD, 20 mg at 02/18/18 2100  •  gabapentin (NEURONTIN) capsule 800 mg, 800 mg, Oral, Q6H, Alysha Chavez MD, 800 mg at 02/19/18 0608  •   hydrOXYzine (ATARAX) tablet 50 mg, 50 mg, Oral, Q6H PRN, Alysha Chavez MD  •  ibuprofen (ADVIL,MOTRIN) tablet 800 mg, 800 mg, Oral, Q6H PRN, Alysha Chavez MD, 800 mg at 02/18/18 2101  •  loperamide (IMODIUM) capsule 2 mg, 2 mg, Oral, 4x Daily PRN, Alysha Chavez MD  •  magnesium hydroxide (MILK OF MAGNESIA) suspension 2400 mg/10mL 10 mL, 10 mL, Oral, Daily PRN, Alysha Chavez MD  •  nitrofurantoin (MACRODANTIN) capsule 100 mg, 100 mg, Oral, BID, Alysha Chavez MD, 100 mg at 02/18/18 2100  •  ondansetron (ZOFRAN) tablet 4 mg, 4 mg, Oral, Q6H PRN, Alysha Chavez MD  •  ondansetron (ZOFRAN) tablet 4 mg, 4 mg, Oral, Q8H PRN, Alysha Chavez MD  •  sertraline (ZOLOFT) tablet 100 mg, 100 mg, Oral, Daily, Alysha Chavez MD, 100 mg at 02/18/18 0830  •  sodium chloride (OCEAN) nasal spray 2 spray, 2 spray, Each Nare, PRN, Alysha Chavez MD  •  traZODone (DESYREL) tablet 50 mg, 50 mg, Oral, Nightly PRN, Alysha Chavez MD, 50 mg at 02/18/18 2101    ASSESSMENT & PLAN    MDD, recurrent, severe, without psychosis  -continue Zoloft 100mg QD, encouraged compliance, SP3 precautions, work on safety planning.       Polysubstance use disorder  -Encourage abstinence, comfort medications as required. Encouraged patient to discuss rehab treatment options with therapist.       UTI   -Macrobid 100mg BID for 7 days      Hepatitis C  -Patient stated she is aware of this diagnosis, will need follow up outpatient.       Neuropathy  -Continue home gabapentin, per pharmacy [facility] appropriate       GERD  -continue home pepcid      Suicide precautions: Suicide precaution Level 3 (q15 min checks)     Behavioral Health Treatment Plan and Problem List: I have reviewed and approved the Behavioral Health Treatment Plan and Problem list.    Clinician:  Delano Mckee MD  02/19/18  8:35 AM     Electronically signed by Delano Mckee MD at 2/19/2018  8:48 AM

## 2018-02-19 NOTE — PROGRESS NOTES
Navigator is helping Primary Therapist with discharge planning for patient. Navigator completed the following referrals on this day:    [facility]  [phone]

## 2018-02-20 PROCEDURE — 99232 SBSQ HOSP IP/OBS MODERATE 35: CPT | Performed by: PSYCHIATRY & NEUROLOGY

## 2018-02-20 RX ORDER — GABAPENTIN 300 MG/1
600 CAPSULE ORAL 3 TIMES DAILY
Status: DISCONTINUED | OUTPATIENT
Start: 2018-02-20 | End: 2018-02-21

## 2018-02-20 RX ORDER — NAPROXEN 250 MG/1
250 TABLET ORAL 2 TIMES DAILY WITH MEALS
Status: DISCONTINUED | OUTPATIENT
Start: 2018-02-20 | End: 2018-02-22

## 2018-02-20 RX ADMIN — FAMOTIDINE 20 MG: 20 TABLET, FILM COATED ORAL at 09:03

## 2018-02-20 RX ADMIN — NAPROXEN 250 MG: 250 TABLET ORAL at 17:03

## 2018-02-20 RX ADMIN — HYDROXYZINE HYDROCHLORIDE 50 MG: 50 TABLET ORAL at 16:40

## 2018-02-20 RX ADMIN — NAPROXEN 250 MG: 250 TABLET ORAL at 11:58

## 2018-02-20 RX ADMIN — PANTOPRAZOLE SODIUM 40 MG: 40 TABLET, DELAYED RELEASE ORAL at 06:04

## 2018-02-20 RX ADMIN — TRAZODONE HYDROCHLORIDE 50 MG: 50 TABLET ORAL at 20:39

## 2018-02-20 RX ADMIN — GABAPENTIN 600 MG: 300 CAPSULE ORAL at 16:40

## 2018-02-20 RX ADMIN — NITROFURANTOIN MACROCRYSTALS 100 MG: 50 CAPSULE ORAL at 20:39

## 2018-02-20 RX ADMIN — NITROFURANTOIN MACROCRYSTALS 100 MG: 50 CAPSULE ORAL at 09:03

## 2018-02-20 RX ADMIN — SERTRALINE 150 MG: 50 TABLET, FILM COATED ORAL at 09:03

## 2018-02-20 RX ADMIN — GABAPENTIN 600 MG: 300 CAPSULE ORAL at 20:39

## 2018-02-20 RX ADMIN — FAMOTIDINE 20 MG: 20 TABLET, FILM COATED ORAL at 20:39

## 2018-02-20 RX ADMIN — GABAPENTIN 600 MG: 300 CAPSULE ORAL at 09:04

## 2018-02-20 NOTE — PROGRESS NOTES
"INPATIENT PSYCHIATRIC PROGRESS NOTE    Name:  Brittaney Park  :  1960  MRN:  [id number]  Visit Number:  [id number]  Length of stay:  4    Behavioral Health Treatment Plan and Problem List: I have reviewed and approved the Behavioral Health Treatment Plan and Problem list.    SUBJECTIVE  CC: \"Not doing good, a nervous wreck\".     INTERVAL HISTORY: Hesitantly agrees with our going forward with referral for residential treatment. Complaining about right leg pain and LBP - acknowledges the programs might not allow for the Neurontin.   Will try to taper off the Neurontin and with a NSAID plus a PPI, somewhat risky due the patient already experiencing GERD symptoms.      Depression rating 8/10  Anxiety rating 9/10  Sleep: 7-8 Hours  Withdrawal sx: none  Cravin/10       Review of Systems   Respiratory: Positive for shortness of breath.    Cardiovascular: Negative.    Gastrointestinal:        \"Heart burn\".    Musculoskeletal: Positive for arthralgias and back pain.   Neurological: Negative.          OBJECTIVE    Temp:  [97 °F (36.1 °C)] 97 °F (36.1 °C)  Heart Rate:  [72] 72  Resp:  [18] 18  BP: (136)/(82) 136/82    MENTAL STATUS EXAM:      Appearance:Casually dressed, good hygeine.   Cooperation:Cooperative  Psychomotor: No psychomotor agitation/retardation, No EPS, No motor tics  Speech-normal rate, amount.   Mood/Affect: Depressed  Thought Processes: associations intact  Thought Content: negativistic   Hallucination(s): none  Hopelessness: \"Not much\"  Optimistic:minimally  Suicidal Thoughts:  none  Suicidal Plan/Intent: none  Homicidal Thoughts:  absent  Orientation: oriented x 3  Memory: recent intact    Lab Results (last 24 hours)     ** No results found for the last 24 hours. **           Imaging Results (last 24 hours)     ** No results found for the last 24 hours. **           ECG/EMG Results (most recent)     Procedure Component Value Units Date/Time    ECG 12 Lead [[id number]] Collected:  18 1343 "     Updated:  02/19/18 1220    Narrative:       Test Reason : Potential adverse reaction to medications.  Blood Pressure : **/** mmHG  Vent. Rate : 067 BPM     Atrial Rate : 067 BPM     P-R Int : 130 ms          QRS Dur : 076 ms      QT Int : 420 ms       P-R-T Axes : 036 -01 053 degrees     QTc Int : 443 ms    Normal sinus rhythm  Normal ECG  No previous ECGs available  Confirmed by Jamal Chinchilla ([id number]) on 2/19/2018 12:20:18 PM    Referred By:  LAURA           Confirmed By:Jamal Chinchilla           ALLERGIES: Review of patient's allergies indicates no known allergies.      Current Facility-Administered Medications:   •  acetaminophen (TYLENOL) 160 MG/5ML solution 650 mg, 650 mg, Oral, Q4H PRN, Alysha Chavez MD  •  aluminum-magnesium hydroxide-simethicone (MAALOX MAX) 400-400-40 MG/5ML suspension 15 mL, 15 mL, Oral, Q6H PRN, Alysha Chavez MD  •  benzonatate (TESSALON) capsule 100 mg, 100 mg, Oral, TID PRN, Alysha Chavez MD  •  benztropine (COGENTIN) tablet 1 mg, 1 mg, Oral, Daily PRN **OR** benztropine (COGENTIN) injection 0.5 mg, 0.5 mg, Intramuscular, Daily PRN, Alysha Chavez MD  •  famotidine (PEPCID) tablet 20 mg, 20 mg, Oral, BID PRN, Alysha Chavez MD  •  famotidine (PEPCID) tablet 20 mg, 20 mg, Oral, BID, Alysha Chavez MD, 20 mg at 02/19/18 2043  •  gabapentin (NEURONTIN) capsule 800 mg, 800 mg, Oral, TID, Delano Mckee MD, 800 mg at 02/19/18 2043  •  hydrOXYzine (ATARAX) tablet 50 mg, 50 mg, Oral, Q6H PRN, Alysha Chavez MD, 50 mg at 02/19/18 1412  •  ibuprofen (ADVIL,MOTRIN) tablet 800 mg, 800 mg, Oral, Q6H PRN, Alysha Chavez MD, 800 mg at 02/19/18 1411  •  loperamide (IMODIUM) capsule 2 mg, 2 mg, Oral, 4x Daily PRN, Alysha Chavez MD  •  magnesium hydroxide (MILK OF MAGNESIA) suspension 2400 mg/10mL 10 mL, 10 mL, Oral, Daily PRN, Alysha Chavez MD  •  nitrofurantoin (MACRODANTIN) capsule 100 mg, 100 mg, Oral, BID, Alysha Chavez MD, 100 mg at 02/19/18 2043  •  ondansetron (ZOFRAN) tablet 4 mg, 4 mg, Oral, Q6H PRN,  Alysha Chavez MD  •  ondansetron (ZOFRAN) tablet 4 mg, 4 mg, Oral, Q8H PRN, Alysha Chavez MD  •  pantoprazole (PROTONIX) EC tablet 40 mg, 40 mg, Oral, Q AM, Delano Mckee MD, 40 mg at 02/20/18 0604  •  sertraline (ZOLOFT) tablet 150 mg, 150 mg, Oral, Daily, Delano Mckee MD, 150 mg at 02/19/18 0956  •  sodium chloride (OCEAN) nasal spray 2 spray, 2 spray, Each Nare, PRN, Alysha Chavez MD  •  traZODone (DESYREL) tablet 50 mg, 50 mg, Oral, Nightly PRN, Alysha Chavez MD, 50 mg at 02/18/18 2101    ASSESSMENT & PLAN    MDD, recurrent, severe, without psychosis  -continue Zoloft 100mg QD, encouraged compliance, SP3 precautions, work on safety planning.       Polysubstance use disorder  -Encourage abstinence, comfort medications as required. Encouraged patient to discuss rehab treatment options with therapist.       UTI   -Macrobid 100mg BID for 7 days      Hepatitis C  -Patient stated she is aware of this diagnosis, will need follow up outpatient.       Neuropathy  - Taper the Neurontin.     GERD  -continue home pepcid      Suicide precautions: Suicide precaution Level 3 (q15 min checks)     Behavioral Health Treatment Plan and Problem List: I have reviewed and approved the Behavioral Health Treatment Plan and Problem list.    Clinician:  Delano Mckee MD  02/20/18  8:47 AM

## 2018-02-20 NOTE — PROGRESS NOTES
1625: Navigator received call from Madalyn at [facility]. Madalyn had attempted to complete phone interview with patient. Madalyn states she called the unit and nursing staff informed her that patient stated she is not ready to complete assessment at this time. Patient requests that Madalyn call back tomorrow morning and try again. Madalyn states if patient becomes interested before she calls back that patient can call her to complete.     1140: Navigator phoned [facility] to check on referral. Savanna states that Madalyn has the referral and she will be in later today. Navigator will call back later today to check status.     ARC - [phone]

## 2018-02-20 NOTE — PLAN OF CARE
Problem:  Patient Care Overview (Adult)  Goal: Plan of Care Review  Outcome: Ongoing (interventions implemented as appropriate)   02/20/18 1605   Coping/Psychosocial Response Interventions   Plan Of Care Reviewed With patient   Coping/Psychosocial   Patient Agreement with Plan of Care agrees   Patient Care Overview   Progress no change   Outcome Evaluation   Outcome Summary/Follow up Plan In her room most of today. Rates her depression 8. Denies si/hi. States she is still trying to figure out what she is going to do re going to rehab. Refused telephone interview.       Problem:  Overarching Goals  Goal: Adheres to Safety Considerations for Self and Others  Outcome: Ongoing (interventions implemented as appropriate)    Goal: Optimized Coping Skills in Response to Life Stressors  Outcome: Ongoing (interventions implemented as appropriate)    Goal: Develops/Participates in Therapeutic [place] to Support Successful Transition  Outcome: Ongoing (interventions implemented as appropriate)

## 2018-02-20 NOTE — PLAN OF CARE
Problem:  Patient Care Overview (Adult)  Goal: Plan of Care Review  Outcome: Ongoing (interventions implemented as appropriate)   02/19/18 1659 02/19/18 2020 02/20/18 0118   Coping/Psychosocial Response Interventions   Plan Of Care Reviewed With --  patient --    Coping/Psychosocial   Patient Agreement with Plan of Care --  agrees --    Patient Care Overview   Progress improving --  --    Outcome Evaluation   Outcome Summary/Follow up Plan --  --  patient rates anxiety 7, depression 9. Denies SI/HI/AVH pt oriented to person and place but does not know the date.        Problem:  Overarching Goals  Goal: Adheres to Safety Considerations for Self and Others  Outcome: Ongoing (interventions implemented as appropriate)    Goal: Optimized Coping Skills in Response to Life Stressors  Outcome: Ongoing (interventions implemented as appropriate)    Goal: Develops/Participates in Therapeutic [place] to Support Successful Transition  Outcome: Ongoing (interventions implemented as appropriate)

## 2018-02-20 NOTE — PROGRESS NOTES
"1030  Met with patient for individual, she reports feeling \"nervous today\". Patient was in the day room and encouraged her to not isolate in her room. We discussed residential treatment referrals and informed her that [facility] will not accept her taking Neurontin, she explained Dr. Mckee was tapering the dose, she was concerned about stopping the medication. [facility] Navigator staff contacted [facility] and they will only accept Neurontin for patients with seizure disorder.    2:30   Patient agreed to complete a phone assessment today with [facility], she did ask to finish her nap first. RN staff were provided contact information and ask to let the patient use the phone for the assessment.     Patient appears concerned and anxious about residential treatment and medication changes. She was receptive to case management services with [facility] if she does not go to rehab or when she comes back. She denies suicidal thoughts, she rates depression at 8/10 and anxiety at 9/10 and reports cravings at 9/10. She complains that her legs is \"killing her\". She has discussed with RN staff today and Dr. Mckee. We discussed relapse prevention and encouraged follow up care.      "

## 2018-02-21 PROCEDURE — 99232 SBSQ HOSP IP/OBS MODERATE 35: CPT | Performed by: PSYCHIATRY & NEUROLOGY

## 2018-02-21 RX ORDER — GABAPENTIN 300 MG/1
300 CAPSULE ORAL 3 TIMES DAILY
Status: DISCONTINUED | OUTPATIENT
Start: 2018-02-21 | End: 2018-02-23 | Stop reason: HOSPADM

## 2018-02-21 RX ADMIN — IBUPROFEN 800 MG: 800 TABLET ORAL at 23:23

## 2018-02-21 RX ADMIN — HYDROXYZINE HYDROCHLORIDE 50 MG: 50 TABLET ORAL at 23:23

## 2018-02-21 RX ADMIN — NAPROXEN 250 MG: 250 TABLET ORAL at 08:34

## 2018-02-21 RX ADMIN — PANTOPRAZOLE SODIUM 40 MG: 40 TABLET, DELAYED RELEASE ORAL at 06:11

## 2018-02-21 RX ADMIN — NAPROXEN 250 MG: 250 TABLET ORAL at 17:02

## 2018-02-21 RX ADMIN — FAMOTIDINE 20 MG: 20 TABLET, FILM COATED ORAL at 08:34

## 2018-02-21 RX ADMIN — TRAZODONE HYDROCHLORIDE 50 MG: 50 TABLET ORAL at 21:13

## 2018-02-21 RX ADMIN — NITROFURANTOIN MACROCRYSTALS 100 MG: 50 CAPSULE ORAL at 21:13

## 2018-02-21 RX ADMIN — GABAPENTIN 300 MG: 300 CAPSULE ORAL at 21:14

## 2018-02-21 RX ADMIN — IBUPROFEN 800 MG: 800 TABLET ORAL at 17:26

## 2018-02-21 RX ADMIN — NITROFURANTOIN MACROCRYSTALS 100 MG: 50 CAPSULE ORAL at 08:34

## 2018-02-21 RX ADMIN — FAMOTIDINE 20 MG: 20 TABLET, FILM COATED ORAL at 21:13

## 2018-02-21 RX ADMIN — SERTRALINE 150 MG: 50 TABLET, FILM COATED ORAL at 08:34

## 2018-02-21 RX ADMIN — GABAPENTIN 300 MG: 300 CAPSULE ORAL at 15:05

## 2018-02-21 RX ADMIN — GABAPENTIN 300 MG: 300 CAPSULE ORAL at 08:35

## 2018-02-21 NOTE — PLAN OF CARE
Problem:  Patient Care Overview (Adult)  Goal: Plan of Care Review  Outcome: Ongoing (interventions implemented as appropriate)   02/20/18 1605 02/20/18 1925 02/21/18 0054   Coping/Psychosocial Response Interventions   Plan Of Care Reviewed With --  patient --    Coping/Psychosocial   Patient Agreement with Plan of Care --  agrees --    Patient Care Overview   Progress no change --  --    Outcome Evaluation   Outcome Summary/Follow up Plan --  --  patient withdrawn in room. pt has been asleep in bed most of evening. rates anxiety and depression both 8. denies SI/HI/AVH. reoriented to place.        Problem:  Overarching Goals  Goal: Adheres to Safety Considerations for Self and Others  Outcome: Ongoing (interventions implemented as appropriate)    Goal: Optimized Coping Skills in Response to Life Stressors  Outcome: Ongoing (interventions implemented as appropriate)    Goal: Develops/Participates in Therapeutic [place] to Support Successful Transition  Outcome: Ongoing (interventions implemented as appropriate)

## 2018-02-21 NOTE — PROGRESS NOTES
"INPATIENT PSYCHIATRIC PROGRESS NOTE    Name:  Brittaney Park  :  1960  MRN:  [id number]  Visit Number:  [id number]  Length of stay:  5    Behavioral Health Treatment Plan and Problem List: I have reviewed and approved the Behavioral Health Treatment Plan and Problem list.    SUBJECTIVE  CC: \"Alright I guess\"     INTERVAL HISTORY: Says she wants to serve others \"in a soup kitchen\", comes across as a sincere goal. Let's build on that, the first phase is her own recovery effort. Hesitantly willing to go forward with the Neurontin taper. \"Not felt good about me in a long time, and really want too\". Says she will call the Recovery program later today.     Depression rating /10  Anxiety rating /10  Sleep: 6-7 hours       Review of Systems   Respiratory: Negative.    Gastrointestinal: Negative.    Musculoskeletal:        Right leg pain   Neurological: Negative.          OBJECTIVE    Temp:  [97.8 °F (36.6 °C)-98.3 °F (36.8 °C)] 98.3 °F (36.8 °C)  Heart Rate:  [80-84] 84  Resp:  [18] 18  BP: (110-140)/(74-79) 110/74    MENTAL STATUS EXAM:      Appearance:Casually dressed, good hygeine.   Cooperation:Cooperative  Psychomotor: No psychomotor agitation/retardation, No EPS, No motor tics  Speech-normal rate, amount.   Mood/Affect: Depressed  Thought Processes: linear, logical, and goal directed  Thought Content: negativistic   Hallucination(s): none  Hopelessness: No  Optimistic:minimally  Suicidal Thoughts:  none  Suicidal Plan/Intent: none  Homicidal Thoughts:  absent  Orientation: oriented x 3  Memory: recent intact    Lab Results (last 24 hours)     ** No results found for the last 24 hours. **           Imaging Results (last 24 hours)     ** No results found for the last 24 hours. **           ECG/EMG Results (most recent)     Procedure Component Value Units Date/Time    ECG 12 Lead [[id number]] Collected:  18 1343     Updated:  18 1220    Narrative:       Test Reason : Potential adverse reaction to " medications.  Blood Pressure : **/** mmHG  Vent. Rate : 067 BPM     Atrial Rate : 067 BPM     P-R Int : 130 ms          QRS Dur : 076 ms      QT Int : 420 ms       P-R-T Axes : 036 -01 053 degrees     QTc Int : 443 ms    Normal sinus rhythm  Normal ECG  No previous ECGs available  Confirmed by Jamal Chinchilla (2013) on 2/19/2018 12:20:18 PM    Referred By:  LAURA           Confirmed By:Jamal Chinchilla           ALLERGIES: Review of patient's allergies indicates no known allergies.      Current Facility-Administered Medications:   •  acetaminophen (TYLENOL) 160 MG/5ML solution 650 mg, 650 mg, Oral, Q4H PRN, Alysha Chavez MD  •  aluminum-magnesium hydroxide-simethicone (MAALOX MAX) 400-400-40 MG/5ML suspension 15 mL, 15 mL, Oral, Q6H PRN, Alysha Chavez MD  •  benzonatate (TESSALON) capsule 100 mg, 100 mg, Oral, TID PRN, Alysha Chavez MD  •  benztropine (COGENTIN) tablet 1 mg, 1 mg, Oral, Daily PRN **OR** benztropine (COGENTIN) injection 0.5 mg, 0.5 mg, Intramuscular, Daily PRN, Alysha Chavez MD  •  famotidine (PEPCID) tablet 20 mg, 20 mg, Oral, BID PRN, Alysha Chavez MD  •  famotidine (PEPCID) tablet 20 mg, 20 mg, Oral, BID, Alysha Chavez MD, 20 mg at 02/20/18 2039  •  gabapentin (NEURONTIN) capsule 600 mg, 600 mg, Oral, TID, Delano Mckee MD, 600 mg at 02/20/18 2039  •  hydrOXYzine (ATARAX) tablet 50 mg, 50 mg, Oral, Q6H PRN, Alysha Chavez MD, 50 mg at 02/20/18 1640  •  ibuprofen (ADVIL,MOTRIN) tablet 800 mg, 800 mg, Oral, Q6H PRN, Alysha Chavez MD, 800 mg at 02/19/18 1411  •  loperamide (IMODIUM) capsule 2 mg, 2 mg, Oral, 4x Daily PRN, Alysha Chavez MD  •  magnesium hydroxide (MILK OF MAGNESIA) suspension 2400 mg/10mL 10 mL, 10 mL, Oral, Daily PRN, Alysha Chavez MD  •  naproxen (NAPROSYN) tablet 250 mg, 250 mg, Oral, BID With Meals, Delano Mckee MD, 250 mg at 02/20/18 1703  •  nitrofurantoin (MACRODANTIN) capsule 100 mg, 100 mg, Oral, BID, Alysha Chavez MD, 100 mg at 02/20/18 2039  •  ondansetron (ZOFRAN)  tablet 4 mg, 4 mg, Oral, Q6H PRN, Alysha Chavez MD  •  ondansetron (ZOFRAN) tablet 4 mg, 4 mg, Oral, Q8H PRN, Alysha Chavez MD  •  pantoprazole (PROTONIX) EC tablet 40 mg, 40 mg, Oral, Q AM, Delano Mckee MD, 40 mg at 02/21/18 0611  •  sertraline (ZOLOFT) tablet 150 mg, 150 mg, Oral, Daily, Delano Mckee MD, 150 mg at 02/20/18 0903  •  sodium chloride (OCEAN) nasal spray 2 spray, 2 spray, Each Nare, PRN, Alysha Chavez MD  •  traZODone (DESYREL) tablet 50 mg, 50 mg, Oral, Nightly PRN, Alysha Chavez MD, 50 mg at 02/20/18 2039    ASSESSMENT & PLAN    Patient Active Problem List   Diagnosis   • Suicidal ideations   • Severe episode of recurrent major depressive disorder, without psychotic features  Plan: Continue the Zoloft and the individual and group psychotherapeutic effort    • Alcoholism Plan: Continue detox with Neurontin, strongly encouraged residential treatment.     • Hep C w/o coma, chronic  Plan: Monitor status    • Chronic pain disorder  Plan: NSAID         Suicide precautions: Suicide precaution Level 3 (q15 min checks)     Behavioral Health Treatment Plan and Problem List: I have reviewed and approved the Behavioral Health Treatment Plan and Problem list.    Clinician:  Delano Mckee MD  02/21/18  7:38 AM

## 2018-02-21 NOTE — PROGRESS NOTES
Called [facility] to follow up on referral that was sent on the patient's behalf. Spoke with Madalyn who is assigned to the patient's case. [facility] reports that patient has not completed her phone assessment and asked to be transferred to the unit to speak with the patient to assess if she is interested in their program.

## 2018-02-21 NOTE — PLAN OF CARE
Problem:  Patient Care Overview (Adult)  Goal: Plan of Care Review  Outcome: Ongoing (interventions implemented as appropriate)   02/21/18 0663   Coping/Psychosocial Response Interventions   Plan Of Care Reviewed With patient   Coping/Psychosocial   Patient Agreement with Plan of Care agrees   Patient Care Overview   Progress no change   Outcome Evaluation   Outcome Summary/Follow up Plan Stays in her room mostly. Rates anxiety and depression both 5. Denies si/hi. Cooperative.       Problem:  Overarching Goals  Goal: Adheres to Safety Considerations for Self and Others  Outcome: Ongoing (interventions implemented as appropriate)    Goal: Optimized Coping Skills in Response to Life Stressors  Outcome: Ongoing (interventions implemented as appropriate)    Goal: Develops/Participates in Therapeutic [place] to Support Successful Transition  Outcome: Ongoing (interventions implemented as appropriate)

## 2018-02-21 NOTE — PROGRESS NOTES
"1400  Met with patient for individual, we discussed contacting [facility] she states she has not called them yet she not sure if she can go off her neurontin  she says she does not take it all most of the time because \"people ask her for it and pressure her for it and she gets scared and gives it away\". Discussed meeting with [facility] Case management and she discussed services she could provide for the patient and encouraged her to consider [facility] at discharge.They also discussed the [facility]P program  Patient was agreeable with this plan and states she has been staying with her 70 yr old sister and there is not room for her there. She reports her sister worked for home health for 40 yrs and wants her to do well. We discussed focus on her recovery. Consent signed for [facility]. They will need contacted tomorrow for available bed space.      Patient denies suicidal thoughts, she rates anxiety and depression at 8/10. She continues to rate cravings at 8.    Individual and group therapy to focus on healthy coping skills and follow up care. Patient was made aware by staff that [facility] had called for her and she declined to take the call stating tell them I am going somewhere else.   "

## 2018-02-22 PROCEDURE — 99232 SBSQ HOSP IP/OBS MODERATE 35: CPT | Performed by: PSYCHIATRY & NEUROLOGY

## 2018-02-22 RX ORDER — NAPROXEN 375 MG/1
375 TABLET ORAL 2 TIMES DAILY WITH MEALS
Status: DISCONTINUED | OUTPATIENT
Start: 2018-02-22 | End: 2018-02-23 | Stop reason: HOSPADM

## 2018-02-22 RX ORDER — DULOXETIN HYDROCHLORIDE 30 MG/1
30 CAPSULE, DELAYED RELEASE ORAL DAILY
Status: DISCONTINUED | OUTPATIENT
Start: 2018-02-22 | End: 2018-02-23 | Stop reason: HOSPADM

## 2018-02-22 RX ADMIN — NAPROXEN 250 MG: 250 TABLET ORAL at 09:02

## 2018-02-22 RX ADMIN — GABAPENTIN 300 MG: 300 CAPSULE ORAL at 16:02

## 2018-02-22 RX ADMIN — GABAPENTIN 300 MG: 300 CAPSULE ORAL at 21:39

## 2018-02-22 RX ADMIN — PANTOPRAZOLE SODIUM 40 MG: 40 TABLET, DELAYED RELEASE ORAL at 06:02

## 2018-02-22 RX ADMIN — IBUPROFEN 800 MG: 800 TABLET ORAL at 21:39

## 2018-02-22 RX ADMIN — HYDROXYZINE HYDROCHLORIDE 50 MG: 50 TABLET ORAL at 21:39

## 2018-02-22 RX ADMIN — MAGNESIUM HYDROXIDE 10 ML: 2400 SUSPENSION ORAL at 18:07

## 2018-02-22 RX ADMIN — NITROFURANTOIN MACROCRYSTALS 100 MG: 50 CAPSULE ORAL at 21:39

## 2018-02-22 RX ADMIN — IBUPROFEN 800 MG: 800 TABLET ORAL at 12:32

## 2018-02-22 RX ADMIN — TRAZODONE HYDROCHLORIDE 50 MG: 50 TABLET ORAL at 21:39

## 2018-02-22 RX ADMIN — FAMOTIDINE 20 MG: 20 TABLET, FILM COATED ORAL at 09:02

## 2018-02-22 RX ADMIN — SERTRALINE 150 MG: 50 TABLET, FILM COATED ORAL at 09:02

## 2018-02-22 RX ADMIN — FAMOTIDINE 20 MG: 20 TABLET, FILM COATED ORAL at 21:39

## 2018-02-22 RX ADMIN — GABAPENTIN 300 MG: 300 CAPSULE ORAL at 09:02

## 2018-02-22 RX ADMIN — NITROFURANTOIN MACROCRYSTALS 100 MG: 50 CAPSULE ORAL at 09:03

## 2018-02-22 RX ADMIN — ONDANSETRON 4 MG: 4 TABLET, FILM COATED ORAL at 18:07

## 2018-02-22 RX ADMIN — DULOXETINE HYDROCHLORIDE 30 MG: 30 CAPSULE, DELAYED RELEASE ORAL at 11:35

## 2018-02-22 NOTE — PLAN OF CARE
Problem: BH Patient Care Overview (Adult)  Goal: Plan of Care Review  Outcome: Ongoing (interventions implemented as appropriate)   02/22/18 1644   Coping/Psychosocial Response Interventions   Plan Of Care Reviewed With patient   Coping/Psychosocial   Patient Agreement with Plan of Care agrees   Patient Care Overview   Progress no change   Outcome Evaluation   Outcome Summary/Follow up Plan Rates her anxiety and depression 7. Denies si/hi. Stays to self mostly. Cooperative.       Problem:  Overarching Goals  Goal: Adheres to Safety Considerations for Self and Others  Outcome: Ongoing (interventions implemented as appropriate)    Goal: Optimized Coping Skills in Response to Life Stressors  Outcome: Ongoing (interventions implemented as appropriate)    Goal: Develops/Participates in Therapeutic [place] to Support Successful Transition  Outcome: Ongoing (interventions implemented as appropriate)

## 2018-02-22 NOTE — PROGRESS NOTES
Navigator is helping Primary Therapist with discharge planning for patient. Navigator contacted [facility] on this day. Shelter staff state that they are full today. Navigator will call back tomorrow morning to check for availability.     [facility] - [phone]

## 2018-02-22 NOTE — PROGRESS NOTES
"INPATIENT PSYCHIATRIC PROGRESS NOTE    Name:  Brittaney Park  :  1960  MRN:  [id number]  Visit Number:  [id number]  Length of stay:  6    Behavioral Health Treatment Plan and Problem List: I have reviewed and approved the Behavioral Health Treatment Plan and Problem list.    SUBJECTIVE  CC: \"Can't do without the Neurontin\"     INTERVAL HISTORY: All kinds of reasons to not Rx her Neurontin (her substance abuse, cravings and the fact of admitted tendency at diversion). See note from yesterdays meeting with her therapist and Kaylen from [facility], apparent to go to the [facility] shelter at discharge if bed available. Will aim for tomorrow.     Her depression is better, clear of any SI/SP.     Will switch antidepressants to Cymbalta, might help with the patient's leg pain complaint (?neuropathy)     Depression rating 6-10  Anxiety rating 3/10  Sleep: 5-6 hours  :  Cravin/10 \"Alcohol\"        Review of Systems   Respiratory: Negative.    Cardiovascular: Negative.    Gastrointestinal: Negative.          OBJECTIVE    Temp:  [96.8 °F (36 °C)-97.6 °F (36.4 °C)] 97.6 °F (36.4 °C)  Heart Rate:  [] 74  Resp:  [18] 18  BP: (120-137)/(75-76) 137/76    MENTAL STATUS EXAM:      Appearance:Casually dressed, good hygeine.   Cooperation:Cooperative  Psychomotor: No psychomotor agitation/retardation, No EPS, No motor tics  Speech-normal rate, amount.   Mood/Affect: Depressed  Thought Processes: linear, logical, and goal directed  Thought Content: negativistic   Hallucination(s): none  Hopelessness: No  Optimistic:minimally  Suicidal Thoughts:  none  Suicidal Plan/Intent: none  Homicidal Thoughts:  absent  Orientation: oriented x 3  Memory: recent intact    Lab Results (last 24 hours)     ** No results found for the last 24 hours. **           Imaging Results (last 24 hours)     ** No results found for the last 24 hours. **           ECG/EMG Results (most recent)     Procedure Component Value Units Date/Time    ECG 12 Lead " [[id number]] Collected:  02/17/18 1343     Updated:  02/19/18 1220    Narrative:       Test Reason : Potential adverse reaction to medications.  Blood Pressure : **/** mmHG  Vent. Rate : 067 BPM     Atrial Rate : 067 BPM     P-R Int : 130 ms          QRS Dur : 076 ms      QT Int : 420 ms       P-R-T Axes : 036 -01 053 degrees     QTc Int : 443 ms    Normal sinus rhythm  Normal ECG  No previous ECGs available  Confirmed by Jamal Chinchilla ([id number]) on 2/19/2018 12:20:18 PM    Referred By:  LAURA           Confirmed By:Jamal Chinchilla           ALLERGIES: Review of patient's allergies indicates no known allergies.      Current Facility-Administered Medications:   •  acetaminophen (TYLENOL) 160 MG/5ML solution 650 mg, 650 mg, Oral, Q4H PRN, Alysha Chavez MD  •  aluminum-magnesium hydroxide-simethicone (MAALOX MAX) 400-400-40 MG/5ML suspension 15 mL, 15 mL, Oral, Q6H PRN, Alysha Chavez MD  •  benzonatate (TESSALON) capsule 100 mg, 100 mg, Oral, TID PRN, Alysha Chavez MD  •  benztropine (COGENTIN) tablet 1 mg, 1 mg, Oral, Daily PRN **OR** benztropine (COGENTIN) injection 0.5 mg, 0.5 mg, Intramuscular, Daily PRN, Alysha Chavez MD  •  famotidine (PEPCID) tablet 20 mg, 20 mg, Oral, BID PRN, Alysha Chavez MD  •  famotidine (PEPCID) tablet 20 mg, 20 mg, Oral, BID, Alysha Chavez MD, 20 mg at 02/22/18 0902  •  gabapentin (NEURONTIN) capsule 300 mg, 300 mg, Oral, TID, Delano Mckee MD, 300 mg at 02/22/18 0902  •  hydrOXYzine (ATARAX) tablet 50 mg, 50 mg, Oral, Q6H PRN, Alysha Chavez MD, 50 mg at 02/21/18 2323  •  ibuprofen (ADVIL,MOTRIN) tablet 800 mg, 800 mg, Oral, Q6H PRN, Alysha Chavez MD, 800 mg at 02/21/18 2323  •  loperamide (IMODIUM) capsule 2 mg, 2 mg, Oral, 4x Daily PRN, Alysha Chavez MD  •  magnesium hydroxide (MILK OF MAGNESIA) suspension 2400 mg/10mL 10 mL, 10 mL, Oral, Daily PRN, Aylsha Chavez MD  •  naproxen (NAPROSYN) tablet 250 mg, 250 mg, Oral, BID With Meals, Delano Mckee MD, 250 mg at 02/22/18 0902  •   nitrofurantoin (MACRODANTIN) capsule 100 mg, 100 mg, Oral, BID, Alysha Chavez MD, 100 mg at 02/22/18 0903  •  ondansetron (ZOFRAN) tablet 4 mg, 4 mg, Oral, Q6H PRN, Alysha Chavez MD  •  ondansetron (ZOFRAN) tablet 4 mg, 4 mg, Oral, Q8H PRN, Alysha Chavez MD  •  pantoprazole (PROTONIX) EC tablet 40 mg, 40 mg, Oral, Q AM, Delano Mckee MD, 40 mg at 02/22/18 0602  •  sertraline (ZOLOFT) tablet 150 mg, 150 mg, Oral, Daily, Delano Mckee MD, 150 mg at 02/22/18 0902  •  sodium chloride (OCEAN) nasal spray 2 spray, 2 spray, Each Nare, PRN, Alysha Chavez MD  •  traZODone (DESYREL) tablet 50 mg, 50 mg, Oral, Nightly PRN, Alysha Chavez MD, 50 mg at 02/21/18 2113    ASSESSMENT & PLAN  MDD, recurrent, severe, without psychosis  Switching to Cymbalta, encouraged compliance, SP3 precautions, work on safety planning.       Polysubstance use disorder  -Encourage abstinence, comfort medications as required. Encouraged patient to discuss rehab treatment options with therapist.       UTI   -Macrobid 100mg BID for 7 days      Hepatitis C  -Patient stated she is aware of this diagnosis, will need follow up outpatient.       Neuropathy  - Have discontinued Neurontin, initiated treatment with Cymbalta.     GERD  -continue home pepcid      Suicide precautions: Suicide precaution Level 3 (q15 min checks)     Behavioral Health Treatment Plan and Problem List: I have reviewed and approved the Behavioral Health Treatment Plan and Problem list.    Clinician:  Delano Mckee MD  02/22/18  10:05 AM

## 2018-02-22 NOTE — PLAN OF CARE
"Problem:  Patient Care Overview (Adult)  Goal: Plan of Care Review  Outcome: Ongoing (interventions implemented as appropriate)   02/22/18 0414   Coping/Psychosocial Response Interventions   Plan Of Care Reviewed With patient   Coping/Psychosocial   Patient Agreement with Plan of Care agrees   Patient Care Overview   Progress no change   Outcome Evaluation   Outcome Summary/Follow up Plan Pt isolates and has little interaction with peers. Rates anxiety 8 and depression 9. Concerns are \"Dr. Mckee is taking me off my Neurontin and I cant make it without it. I'm tore to pieces\" Denies SI/HI and AVH.          "

## 2018-02-23 VITALS
RESPIRATION RATE: 18 BRPM | DIASTOLIC BLOOD PRESSURE: 75 MMHG | BODY MASS INDEX: 36.96 KG/M2 | TEMPERATURE: 97.2 F | SYSTOLIC BLOOD PRESSURE: 137 MMHG | HEIGHT: 66 IN | OXYGEN SATURATION: 97 % | HEART RATE: 69 BPM | WEIGHT: 230 LBS

## 2018-02-23 PROBLEM — R45.851 SUICIDAL IDEATIONS: Status: RESOLVED | Noted: 2018-02-17 | Resolved: 2018-02-23

## 2018-02-23 PROCEDURE — 99239 HOSP IP/OBS DSCHRG MGMT >30: CPT | Performed by: PSYCHIATRY & NEUROLOGY

## 2018-02-23 RX ORDER — NAPROXEN 375 MG/1
375 TABLET ORAL 2 TIMES DAILY WITH MEALS
Qty: 60 TABLET | Refills: 0 | Status: SHIPPED | OUTPATIENT
Start: 2018-02-23

## 2018-02-23 RX ORDER — DULOXETIN HYDROCHLORIDE 60 MG/1
60 CAPSULE, DELAYED RELEASE ORAL DAILY
Qty: 30 CAPSULE | Refills: 0 | Status: SHIPPED | OUTPATIENT
Start: 2018-02-23 | End: 2019-02-23

## 2018-02-23 RX ADMIN — NITROFURANTOIN MACROCRYSTALS 100 MG: 50 CAPSULE ORAL at 08:23

## 2018-02-23 RX ADMIN — PANTOPRAZOLE SODIUM 40 MG: 40 TABLET, DELAYED RELEASE ORAL at 06:25

## 2018-02-23 RX ADMIN — FAMOTIDINE 20 MG: 20 TABLET, FILM COATED ORAL at 08:25

## 2018-02-23 RX ADMIN — GABAPENTIN 300 MG: 300 CAPSULE ORAL at 08:23

## 2018-02-23 RX ADMIN — NAPROXEN 375 MG: 375 TABLET ORAL at 08:24

## 2018-02-23 RX ADMIN — DULOXETINE HYDROCHLORIDE 30 MG: 30 CAPSULE, DELAYED RELEASE ORAL at 08:23

## 2018-02-23 NOTE — PLAN OF CARE
Problem:  Patient Care Overview (Adult)  Goal: Plan of Care Review  Outcome: Ongoing (interventions implemented as appropriate)   02/23/18 0441   Coping/Psychosocial Response Interventions   Plan Of Care Reviewed With patient   Coping/Psychosocial   Patient Agreement with Plan of Care agrees   Patient Care Overview   Progress improving   Outcome Evaluation   Outcome Summary/Follow up Plan Pt isolated in room this shift. Pt reports shes ready for discharge today to [facility]. Rates anxiety and depression 8. Denies SI/HI and AVH.

## 2018-02-23 NOTE — DISCHARGE SUMMARY
Date of Discharge:  2/23/2018    Discharge Diagnosis:Principal Problem:    Severe episode of recurrent major depressive disorder, without psychotic features  Active Problems:    Alcoholism    Hep C w/o coma, chronic    Chronic pain disorder        Presenting Problem/History of Present Illness: Patient presented in the hospital emergency room expressing suicidal intent, see history of present illness for details.      Hospital Course:  Patient was admitted for safety and stabilization and was placed on standard precautions.  Routine labs were checked.  Patient was assigned a masters level therapist and provided with an opportunity to participate in group and individual therapy on the unit.  Patient seen on a daily basis for evaluation and supportive therapy.  Patient initially continued on Zoloft and subsequently taper off the Neurontin.  Added in NSAID naproxen along with a PPI Protonix and change the Zoloft to Cymbalta prior to discharge.  Patient was hoping to get in a residential recovery program R the shelter at the [facility] when feasible.  Patient to stay with her sister in [place] detail available slot opens at either program.  Patient has a  with the [facility] whom she also will be in contact with post hospital.  See below for medications prescribed and recommended.    Consults:   Consults     No orders found from 1/18/2018 to 2/17/2018.          Labs:  Lab Results (all)     Procedure Component Value Units Date/Time    Hepatitis Panel, Acute [[id number]]  (Abnormal) Collected:  02/17/18 0457    Specimen:  Blood Updated:  02/17/18 0753     Hepatitis B Surface Ag Non-Reactive     Hep A IgM Non-Reactive     Hep B C IgM Non-Reactive     Hepatitis C Ab Reactive (A)          Imaging:  Imaging Results (all)     None                  Condition on Discharge:  improved    Prognosis: FAIR    Vital Signs  Temp:  [98.2 °F (36.8 °C)] 98.2 °F (36.8 °C)  Heart Rate:  [80] 80  Resp:  [18] 18  BP: (121)/(86)  121/86    Discharge Disposition  Home or Self Care    Discharge Medications   Brittaney Park   Home Medication Instructions DUSTIN:[id number]    Printed on:02/23/18 0817   Medication Information                      DULoxetine (CYMBALTA) 60 MG capsule  Take 1 capsule by mouth Daily.             naproxen (NAPROSYN) 375 MG tablet  Take 1 tablet by mouth 2 (Two) Times a Day With Meals.             raNITIdine (ZANTAC) 150 MG tablet  Take 300 mg by mouth 2 (Two) Times a Day.                 Discharge Diet: regular    Activity at Discharge: no restrictions    Follow-up Appointments: to follow up with JANELLE Mckee MD  02/23/18  8:17 AM  Time spent with the discharge process >30 minutes.

## 2018-02-23 NOTE — PROGRESS NOTES
The patient is being discharged today. She will follow up with [facility]. There was no bed available at [facility] today. The patient will instead be staying with her sister until which time an opening comes available at [facility]. The patient will be transported by [facility] if her family is not available for transport.

## 2021-04-12 NOTE — PROGRESS NOTES
"2    ID:Brittaney Park is a 57 y.o. female    CC: Depression, SI, alcohol and polysubstance use disorder     HPI: Today patient was found to be in her room resting. She stated that she is doing poorly, reports physical symptoms today as noted below. She denied SI/HI/AVH today.     Depression rating 10/10  Anxiety rating 8/10  Sleep:\"allright\"   Appetite: \"fine\"  Withdrawal sx:see ROS  Craving: 10/10    Review of Systems   Constitutional: Positive for diaphoresis.   Respiratory: Negative.    Cardiovascular: Negative.    Gastrointestinal: Positive for nausea. Negative for diarrhea.   Genitourinary: Negative.    Neurological: Positive for headaches.   Psychiatric/Behavioral: Positive for dysphoric mood. The patient is nervous/anxious.        Temp:  [97.6 °F (36.4 °C)-97.8 °F (36.6 °C)] 97.8 °F (36.6 °C)  Heart Rate:  [69-75] 69  Resp:  [18] 18  BP: (143-164)/(70-87) 143/70    MENTAL STATUS EXAM:  Appearance:Neatly, casually dressed, fairhygeine.   Cooperation:Cooperative  Orientation: Ox4  Gait and station stable.   Psychomotor: No psychomotor agitation/retardation, No EPS, No motor tics  Speech-normal rate, amount.  Mood \"poor\"  Affect-dysthymic  Thought Content-goal directed, no delusional material present  Thought process-linear, organized.  Suicidality: No SI today  Homicidality: No HI  Perception: No AH/VH  Insight-limited  Judgement-limited    Lab Results (last 24 hours)     ** No results found for the last 24 hours. **          ALLERGIES: Review of patient's allergies indicates no known allergies.      Current Facility-Administered Medications:   •  acetaminophen (TYLENOL) 160 MG/5ML solution 650 mg, 650 mg, Oral, Q4H PRN, Alysha Chavez MD  •  aluminum-magnesium hydroxide-simethicone (MAALOX MAX) 400-400-40 MG/5ML suspension 15 mL, 15 mL, Oral, Q6H PRN, Alysha Chavez MD  •  benzonatate (TESSALON) capsule 100 mg, 100 mg, Oral, TID PRN, Alysha Chavez MD  •  benztropine (COGENTIN) tablet 1 mg, 1 mg, Oral, Daily PRN " Bedside and Verbal shift change report given to Elizabeth Jaquez RN (oncoming nurse) by myself (offgoing nurse). Report included the following information SBAR, Kardex, MAR and Recent Results. **OR** benztropine (COGENTIN) injection 0.5 mg, 0.5 mg, Intramuscular, Daily PRN, Alysha Chavez MD  •  famotidine (PEPCID) tablet 20 mg, 20 mg, Oral, BID PRN, Alysha Chavez MD  •  famotidine (PEPCID) tablet 20 mg, 20 mg, Oral, BID, Alysha Chavez MD, 20 mg at 02/18/18 0830  •  gabapentin (NEURONTIN) capsule 800 mg, 800 mg, Oral, Q6H, Alysha Chavez MD, 800 mg at 02/18/18 0612  •  hydrOXYzine (ATARAX) tablet 50 mg, 50 mg, Oral, Q6H PRN, Alysha Chavez MD  •  ibuprofen (ADVIL,MOTRIN) tablet 800 mg, 800 mg, Oral, Q6H PRN, Alysha Chavez MD, 800 mg at 02/18/18 0830  •  loperamide (IMODIUM) capsule 2 mg, 2 mg, Oral, 4x Daily PRN, Alysha Chavez MD  •  magnesium hydroxide (MILK OF MAGNESIA) suspension 2400 mg/10mL 10 mL, 10 mL, Oral, Daily PRN, Alysha Chavez MD  •  nitrofurantoin (MACRODANTIN) capsule 100 mg, 100 mg, Oral, BID, Alysha Chavez MD, 100 mg at 02/18/18 0830  •  ondansetron (ZOFRAN) tablet 4 mg, 4 mg, Oral, Q6H PRN, Alysha Chavez MD  •  ondansetron (ZOFRAN) tablet 4 mg, 4 mg, Oral, Q8H PRN, Alysha Chavez MD  •  sertraline (ZOLOFT) tablet 100 mg, 100 mg, Oral, Daily, Alysha Chavez MD, 100 mg at 02/18/18 0830  •  sodium chloride (OCEAN) nasal spray 2 spray, 2 spray, Each Nare, PRN, Alysha Chavez MD  •  traZODone (DESYREL) tablet 50 mg, 50 mg, Oral, Nightly PRN, Alysha Chavez MD  •  acetaminophen  •  aluminum-magnesium hydroxide-simethicone  •  benzonatate  •  benztropine **OR** benztropine  •  famotidine  •  hydrOXYzine  •  ibuprofen  •  loperamide  •  magnesium hydroxide  •  ondansetron  •  ondansetron  •  sodium chloride  •  traZODone    SAFETY PRECAUTIONS: SP LEVEL III    ASSESSMENT/PLAN:  MDD, recurrent, severe, without psychosis  -continue Zoloft 100mg QD, encouraged compliance, SP3 precautions, work on safety planning.      Polysubstance use disorder  -Encourage abstinence, comfort medications as required. Encouraged patient to discuss rehab treatment options with therapist.      UTI   -Macrobid 100mg BID for 7  days     Hepatitis C  -Patient stated she is aware of this diagnosis, will need follow up outpatient.      Neuropathy  -Continue home gabapentin, per pharmacy [facility] appropriate      GERD  -continue home pepcid    I have reviewed the treatment plan and agree with current plan.  Treatment was discussed with the patient who is agreeable to this treatment and plan.

## 2024-11-17 ENCOUNTER — APPOINTMENT (OUTPATIENT)
Dept: CT IMAGING | Facility: HOSPITAL | Age: 64
End: 2024-11-17
Payer: COMMERCIAL

## 2024-11-17 ENCOUNTER — HOSPITAL ENCOUNTER (INPATIENT)
Facility: HOSPITAL | Age: 64
LOS: 2 days | Discharge: HOME | End: 2024-11-20
Attending: STUDENT IN AN ORGANIZED HEALTH CARE EDUCATION/TRAINING PROGRAM | Admitting: INTERNAL MEDICINE
Payer: COMMERCIAL

## 2024-11-17 ENCOUNTER — APPOINTMENT (OUTPATIENT)
Dept: GENERAL RADIOLOGY | Facility: HOSPITAL | Age: 64
End: 2024-11-17
Payer: COMMERCIAL

## 2024-11-17 DIAGNOSIS — I48.91 NEW ONSET ATRIAL FIBRILLATION: Primary | ICD-10-CM

## 2024-11-17 DIAGNOSIS — R44.3 HALLUCINATIONS: ICD-10-CM

## 2024-11-17 DIAGNOSIS — I48.91 ATRIAL FIBRILLATION WITH RVR: ICD-10-CM

## 2024-11-17 DIAGNOSIS — F15.10 METHAMPHETAMINE USE: ICD-10-CM

## 2024-11-17 LAB
ALBUMIN SERPL-MCNC: 3.8 G/DL (ref 3.5–5.2)
ALBUMIN/GLOB SERPL: 1.1 G/DL
ALP SERPL-CCNC: 101 U/L (ref 39–117)
ALT SERPL W P-5'-P-CCNC: 7 U/L (ref 1–33)
AMPHET+METHAMPHET UR QL: POSITIVE
AMPHETAMINES UR QL: POSITIVE
ANION GAP SERPL CALCULATED.3IONS-SCNC: 12.8 MMOL/L (ref 5–15)
APAP SERPL-MCNC: <5 MCG/ML (ref 0–30)
AST SERPL-CCNC: 14 U/L (ref 1–32)
BARBITURATES UR QL SCN: NEGATIVE
BASOPHILS # BLD AUTO: 0.07 10*3/MM3 (ref 0–0.2)
BASOPHILS NFR BLD AUTO: 0.8 % (ref 0–1.5)
BENZODIAZ UR QL SCN: NEGATIVE
BILIRUB SERPL-MCNC: 0.2 MG/DL (ref 0–1.2)
BILIRUB UR QL STRIP: NEGATIVE
BUN SERPL-MCNC: 17 MG/DL (ref 8–23)
BUN/CREAT SERPL: 12.1 (ref 7–25)
BUPRENORPHINE SERPL-MCNC: POSITIVE NG/ML
CALCIUM SPEC-SCNC: 9.9 MG/DL (ref 8.6–10.5)
CANNABINOIDS SERPL QL: NEGATIVE
CHLORIDE SERPL-SCNC: 104 MMOL/L (ref 98–107)
CLARITY UR: CLEAR
CO2 SERPL-SCNC: 24.2 MMOL/L (ref 22–29)
COCAINE UR QL: NEGATIVE
COLOR UR: YELLOW
CREAT SERPL-MCNC: 1.41 MG/DL (ref 0.57–1)
D-LACTATE SERPL-SCNC: 2 MMOL/L (ref 0.5–2)
DEPRECATED RDW RBC AUTO: 47.9 FL (ref 37–54)
EGFRCR SERPLBLD CKD-EPI 2021: 42.3 ML/MIN/1.73
EOSINOPHIL # BLD AUTO: 0.33 10*3/MM3 (ref 0–0.4)
EOSINOPHIL NFR BLD AUTO: 3.9 % (ref 0.3–6.2)
ERYTHROCYTE [DISTWIDTH] IN BLOOD BY AUTOMATED COUNT: 14.9 % (ref 12.3–15.4)
FENTANYL UR-MCNC: NEGATIVE NG/ML
FLUAV RNA RESP QL NAA+PROBE: NOT DETECTED
FLUBV RNA RESP QL NAA+PROBE: NOT DETECTED
GLOBULIN UR ELPH-MCNC: 3.4 GM/DL
GLUCOSE SERPL-MCNC: 84 MG/DL (ref 65–99)
GLUCOSE UR STRIP-MCNC: NEGATIVE MG/DL
HCT VFR BLD AUTO: 36.7 % (ref 34–46.6)
HGB BLD-MCNC: 10.7 G/DL (ref 12–15.9)
HGB UR QL STRIP.AUTO: NEGATIVE
HOLD SPECIMEN: NORMAL
HOLD SPECIMEN: NORMAL
HYPOCHROMIA BLD QL: NORMAL
IMM GRANULOCYTES # BLD AUTO: 0.02 10*3/MM3 (ref 0–0.05)
IMM GRANULOCYTES NFR BLD AUTO: 0.2 % (ref 0–0.5)
KETONES UR QL STRIP: NEGATIVE
LARGE PLATELETS: NORMAL
LEUKOCYTE ESTERASE UR QL STRIP.AUTO: NEGATIVE
LYMPHOCYTES # BLD AUTO: 4.63 10*3/MM3 (ref 0.7–3.1)
LYMPHOCYTES NFR BLD AUTO: 54.6 % (ref 19.6–45.3)
MAGNESIUM SERPL-MCNC: 2 MG/DL (ref 1.6–2.4)
MCH RBC QN AUTO: 25.4 PG (ref 26.6–33)
MCHC RBC AUTO-ENTMCNC: 29.2 G/DL (ref 31.5–35.7)
MCV RBC AUTO: 87 FL (ref 79–97)
METHADONE UR QL SCN: NEGATIVE
MONOCYTES # BLD AUTO: 0.71 10*3/MM3 (ref 0.1–0.9)
MONOCYTES NFR BLD AUTO: 8.4 % (ref 5–12)
NEUTROPHILS NFR BLD AUTO: 2.72 10*3/MM3 (ref 1.7–7)
NEUTROPHILS NFR BLD AUTO: 32.1 % (ref 42.7–76)
NITRITE UR QL STRIP: NEGATIVE
NRBC BLD AUTO-RTO: 0 /100 WBC (ref 0–0.2)
NT-PROBNP SERPL-MCNC: 965.3 PG/ML (ref 0–900)
OPIATES UR QL: NEGATIVE
OXYCODONE UR QL SCN: NEGATIVE
PCP UR QL SCN: NEGATIVE
PH UR STRIP.AUTO: 5.5 [PH] (ref 5–8)
PLATELET # BLD AUTO: 307 10*3/MM3 (ref 140–450)
PMV BLD AUTO: 8.7 FL (ref 6–12)
POTASSIUM SERPL-SCNC: 4 MMOL/L (ref 3.5–5.2)
PROT SERPL-MCNC: 7.2 G/DL (ref 6–8.5)
PROT UR QL STRIP: NEGATIVE
RBC # BLD AUTO: 4.22 10*6/MM3 (ref 3.77–5.28)
SALICYLATES SERPL-MCNC: <0.3 MG/DL
SARS-COV-2 RNA RESP QL NAA+PROBE: NOT DETECTED
SODIUM SERPL-SCNC: 141 MMOL/L (ref 136–145)
SP GR UR STRIP: 1.01 (ref 1–1.03)
TRICYCLICS UR QL SCN: NEGATIVE
TROPONIN T SERPL HS-MCNC: 14 NG/L
UROBILINOGEN UR QL STRIP: NORMAL
WBC NRBC COR # BLD AUTO: 8.48 10*3/MM3 (ref 3.4–10.8)
WHOLE BLOOD HOLD COAG: NORMAL
WHOLE BLOOD HOLD SPECIMEN: NORMAL

## 2024-11-17 PROCEDURE — 87040 BLOOD CULTURE FOR BACTERIA: CPT | Performed by: STUDENT IN AN ORGANIZED HEALTH CARE EDUCATION/TRAINING PROGRAM

## 2024-11-17 PROCEDURE — 83735 ASSAY OF MAGNESIUM: CPT | Performed by: STUDENT IN AN ORGANIZED HEALTH CARE EDUCATION/TRAINING PROGRAM

## 2024-11-17 PROCEDURE — 84484 ASSAY OF TROPONIN QUANT: CPT | Performed by: STUDENT IN AN ORGANIZED HEALTH CARE EDUCATION/TRAINING PROGRAM

## 2024-11-17 PROCEDURE — 83880 ASSAY OF NATRIURETIC PEPTIDE: CPT | Performed by: STUDENT IN AN ORGANIZED HEALTH CARE EDUCATION/TRAINING PROGRAM

## 2024-11-17 PROCEDURE — 80307 DRUG TEST PRSMV CHEM ANLYZR: CPT | Performed by: STUDENT IN AN ORGANIZED HEALTH CARE EDUCATION/TRAINING PROGRAM

## 2024-11-17 PROCEDURE — 81003 URINALYSIS AUTO W/O SCOPE: CPT | Performed by: STUDENT IN AN ORGANIZED HEALTH CARE EDUCATION/TRAINING PROGRAM

## 2024-11-17 PROCEDURE — P9612 CATHETERIZE FOR URINE SPEC: HCPCS

## 2024-11-17 PROCEDURE — 25010000002 CEFEPIME PER 500 MG: Performed by: STUDENT IN AN ORGANIZED HEALTH CARE EDUCATION/TRAINING PROGRAM

## 2024-11-17 PROCEDURE — 80143 DRUG ASSAY ACETAMINOPHEN: CPT | Performed by: STUDENT IN AN ORGANIZED HEALTH CARE EDUCATION/TRAINING PROGRAM

## 2024-11-17 PROCEDURE — 83605 ASSAY OF LACTIC ACID: CPT | Performed by: STUDENT IN AN ORGANIZED HEALTH CARE EDUCATION/TRAINING PROGRAM

## 2024-11-17 PROCEDURE — 80053 COMPREHEN METABOLIC PANEL: CPT | Performed by: STUDENT IN AN ORGANIZED HEALTH CARE EDUCATION/TRAINING PROGRAM

## 2024-11-17 PROCEDURE — 84443 ASSAY THYROID STIM HORMONE: CPT | Performed by: INTERNAL MEDICINE

## 2024-11-17 PROCEDURE — 96366 THER/PROPH/DIAG IV INF ADDON: CPT

## 2024-11-17 PROCEDURE — 87636 SARSCOV2 & INF A&B AMP PRB: CPT | Performed by: STUDENT IN AN ORGANIZED HEALTH CARE EDUCATION/TRAINING PROGRAM

## 2024-11-17 PROCEDURE — 96365 THER/PROPH/DIAG IV INF INIT: CPT

## 2024-11-17 PROCEDURE — 36415 COLL VENOUS BLD VENIPUNCTURE: CPT

## 2024-11-17 PROCEDURE — 70450 CT HEAD/BRAIN W/O DYE: CPT

## 2024-11-17 PROCEDURE — 71045 X-RAY EXAM CHEST 1 VIEW: CPT

## 2024-11-17 PROCEDURE — 71045 X-RAY EXAM CHEST 1 VIEW: CPT | Performed by: RADIOLOGY

## 2024-11-17 PROCEDURE — 96367 TX/PROPH/DG ADDL SEQ IV INF: CPT

## 2024-11-17 PROCEDURE — 85007 BL SMEAR W/DIFF WBC COUNT: CPT | Performed by: STUDENT IN AN ORGANIZED HEALTH CARE EDUCATION/TRAINING PROGRAM

## 2024-11-17 PROCEDURE — 93010 ELECTROCARDIOGRAM REPORT: CPT | Performed by: INTERNAL MEDICINE

## 2024-11-17 PROCEDURE — 99285 EMERGENCY DEPT VISIT HI MDM: CPT

## 2024-11-17 PROCEDURE — 80179 DRUG ASSAY SALICYLATE: CPT | Performed by: STUDENT IN AN ORGANIZED HEALTH CARE EDUCATION/TRAINING PROGRAM

## 2024-11-17 PROCEDURE — 70450 CT HEAD/BRAIN W/O DYE: CPT | Performed by: RADIOLOGY

## 2024-11-17 PROCEDURE — 85025 COMPLETE CBC W/AUTO DIFF WBC: CPT | Performed by: STUDENT IN AN ORGANIZED HEALTH CARE EDUCATION/TRAINING PROGRAM

## 2024-11-17 RX ORDER — SODIUM CHLORIDE 0.9 % (FLUSH) 0.9 %
10 SYRINGE (ML) INJECTION AS NEEDED
Status: DISCONTINUED | OUTPATIENT
Start: 2024-11-17 | End: 2024-11-20 | Stop reason: HOSPADM

## 2024-11-17 RX ORDER — VANCOMYCIN 2 GRAM/500 ML IN 0.9 % SODIUM CHLORIDE INTRAVENOUS
2000 ONCE
Status: COMPLETED | OUTPATIENT
Start: 2024-11-17 | End: 2024-11-18

## 2024-11-17 RX ADMIN — CEFEPIME 2000 MG: 2 INJECTION, POWDER, FOR SOLUTION INTRAVENOUS at 21:20

## 2024-11-17 RX ADMIN — VANCOMYCIN 2 GRAM/500 ML IN 0.9 % SODIUM CHLORIDE INTRAVENOUS 2000 MG: at 22:00

## 2024-11-18 PROBLEM — F15.959 STIMULANT-INDUCED PSYCHOTIC DISORDER: Status: ACTIVE | Noted: 2024-11-18

## 2024-11-18 PROBLEM — I48.91 NEW ONSET A-FIB: Status: ACTIVE | Noted: 2024-11-18

## 2024-11-18 PROBLEM — F33.40 MAJOR DEPRESSIVE DISORDER, RECURRENT, IN REMISSION, UNSPECIFIED: Status: ACTIVE | Noted: 2024-11-18

## 2024-11-18 PROBLEM — F32.A DEPRESSION: Status: ACTIVE | Noted: 2024-11-18

## 2024-11-18 LAB
ABSOLUTE LUNG FLUID CONTENT: 29 % (ref 20–35)
CHOLEST SERPL-MCNC: 152 MG/DL (ref 0–200)
HBA1C MFR BLD: 5.6 % (ref 4.8–5.6)
HDLC SERPL-MCNC: 57 MG/DL (ref 40–60)
LDLC SERPL CALC-MCNC: 77 MG/DL (ref 0–100)
LDLC/HDLC SERPL: 1.32 {RATIO}
QT INTERVAL: 292 MS
QT INTERVAL: 354 MS
QTC INTERVAL: 447 MS
QTC INTERVAL: 450 MS
TRIGL SERPL-MCNC: 98 MG/DL (ref 0–150)
TSH SERPL DL<=0.05 MIU/L-ACNC: 5.46 UIU/ML (ref 0.27–4.2)
VLDLC SERPL-MCNC: 18 MG/DL (ref 5–40)

## 2024-11-18 PROCEDURE — 25010000002 BUMETANIDE PER 0.5 MG: Performed by: NURSE PRACTITIONER

## 2024-11-18 PROCEDURE — 99253 IP/OBS CNSLTJ NEW/EST LOW 45: CPT | Performed by: INTERNAL MEDICINE

## 2024-11-18 PROCEDURE — 25810000003 SODIUM CHLORIDE 0.9 % SOLUTION: Performed by: STUDENT IN AN ORGANIZED HEALTH CARE EDUCATION/TRAINING PROGRAM

## 2024-11-18 PROCEDURE — 99223 1ST HOSP IP/OBS HIGH 75: CPT | Performed by: INTERNAL MEDICINE

## 2024-11-18 PROCEDURE — 96367 TX/PROPH/DG ADDL SEQ IV INF: CPT

## 2024-11-18 PROCEDURE — 93005 ELECTROCARDIOGRAM TRACING: CPT | Performed by: INTERNAL MEDICINE

## 2024-11-18 PROCEDURE — 25010000002 ENOXAPARIN PER 10 MG: Performed by: INTERNAL MEDICINE

## 2024-11-18 PROCEDURE — 99221 1ST HOSP IP/OBS SF/LOW 40: CPT | Performed by: PSYCHIATRY & NEUROLOGY

## 2024-11-18 PROCEDURE — 25010000002 MAGNESIUM SULFATE 2 GM/50ML SOLUTION: Performed by: STUDENT IN AN ORGANIZED HEALTH CARE EDUCATION/TRAINING PROGRAM

## 2024-11-18 PROCEDURE — 25810000003 SODIUM CHLORIDE 0.9 % SOLUTION: Performed by: INTERNAL MEDICINE

## 2024-11-18 PROCEDURE — 80061 LIPID PANEL: CPT | Performed by: INTERNAL MEDICINE

## 2024-11-18 PROCEDURE — 96361 HYDRATE IV INFUSION ADD-ON: CPT

## 2024-11-18 PROCEDURE — 93010 ELECTROCARDIOGRAM REPORT: CPT | Performed by: INTERNAL MEDICINE

## 2024-11-18 PROCEDURE — 96372 THER/PROPH/DIAG INJ SC/IM: CPT

## 2024-11-18 PROCEDURE — 96375 TX/PRO/DX INJ NEW DRUG ADDON: CPT

## 2024-11-18 PROCEDURE — 93005 ELECTROCARDIOGRAM TRACING: CPT | Performed by: STUDENT IN AN ORGANIZED HEALTH CARE EDUCATION/TRAINING PROGRAM

## 2024-11-18 PROCEDURE — 83036 HEMOGLOBIN GLYCOSYLATED A1C: CPT | Performed by: INTERNAL MEDICINE

## 2024-11-18 PROCEDURE — 94726 PLETHYSMOGRAPHY LUNG VOLUMES: CPT

## 2024-11-18 RX ORDER — PANTOPRAZOLE SODIUM 40 MG/1
40 TABLET, DELAYED RELEASE ORAL
Status: DISCONTINUED | OUTPATIENT
Start: 2024-11-18 | End: 2024-11-20 | Stop reason: HOSPADM

## 2024-11-18 RX ORDER — GABAPENTIN 300 MG/1
300 CAPSULE ORAL 3 TIMES DAILY PRN
Status: CANCELLED | OUTPATIENT
Start: 2024-11-18

## 2024-11-18 RX ORDER — BISACODYL 10 MG
10 SUPPOSITORY, RECTAL RECTAL DAILY PRN
Status: DISCONTINUED | OUTPATIENT
Start: 2024-11-18 | End: 2024-11-20 | Stop reason: HOSPADM

## 2024-11-18 RX ORDER — BISACODYL 5 MG/1
5 TABLET, DELAYED RELEASE ORAL DAILY PRN
Status: DISCONTINUED | OUTPATIENT
Start: 2024-11-18 | End: 2024-11-20 | Stop reason: HOSPADM

## 2024-11-18 RX ORDER — MUPIROCIN 20 MG/G
1 OINTMENT TOPICAL 3 TIMES DAILY
Status: CANCELLED | OUTPATIENT
Start: 2024-11-18

## 2024-11-18 RX ORDER — OLANZAPINE 5 MG/1
10 TABLET ORAL NIGHTLY
Status: CANCELLED | OUTPATIENT
Start: 2024-11-18

## 2024-11-18 RX ORDER — GABAPENTIN 300 MG/1
300 CAPSULE ORAL 3 TIMES DAILY PRN
COMMUNITY

## 2024-11-18 RX ORDER — NITROGLYCERIN 0.4 MG/1
0.4 TABLET SUBLINGUAL
Status: DISCONTINUED | OUTPATIENT
Start: 2024-11-18 | End: 2024-11-20 | Stop reason: HOSPADM

## 2024-11-18 RX ORDER — ASPIRIN 325 MG
325 TABLET, DELAYED RELEASE (ENTERIC COATED) ORAL DAILY
Status: DISCONTINUED | OUTPATIENT
Start: 2024-11-18 | End: 2024-11-18

## 2024-11-18 RX ORDER — BUMETANIDE 0.25 MG/ML
1 INJECTION, SOLUTION INTRAMUSCULAR; INTRAVENOUS ONCE
Status: COMPLETED | OUTPATIENT
Start: 2024-11-18 | End: 2024-11-18

## 2024-11-18 RX ORDER — ARIPIPRAZOLE 10 MG/1
20 TABLET ORAL DAILY
Status: CANCELLED | OUTPATIENT
Start: 2024-11-19

## 2024-11-18 RX ORDER — BUMETANIDE 0.5 MG/1
0.5 TABLET ORAL DAILY
Status: CANCELLED | OUTPATIENT
Start: 2024-11-18

## 2024-11-18 RX ORDER — ENOXAPARIN SODIUM 100 MG/ML
40 INJECTION SUBCUTANEOUS DAILY
Status: DISCONTINUED | OUTPATIENT
Start: 2024-11-18 | End: 2024-11-18

## 2024-11-18 RX ORDER — AMOXICILLIN 250 MG
2 CAPSULE ORAL 2 TIMES DAILY PRN
Status: DISCONTINUED | OUTPATIENT
Start: 2024-11-18 | End: 2024-11-20 | Stop reason: HOSPADM

## 2024-11-18 RX ORDER — OLANZAPINE 10 MG/1
10 TABLET ORAL NIGHTLY
COMMUNITY
End: 2024-11-20 | Stop reason: HOSPADM

## 2024-11-18 RX ORDER — BUMETANIDE 0.5 MG/1
0.5 TABLET ORAL DAILY
COMMUNITY
End: 2024-11-20 | Stop reason: HOSPADM

## 2024-11-18 RX ORDER — MAGNESIUM SULFATE HEPTAHYDRATE 40 MG/ML
2 INJECTION, SOLUTION INTRAVENOUS ONCE
Status: COMPLETED | OUTPATIENT
Start: 2024-11-18 | End: 2024-11-18

## 2024-11-18 RX ORDER — MUPIROCIN 20 MG/G
1 OINTMENT TOPICAL 3 TIMES DAILY
COMMUNITY

## 2024-11-18 RX ORDER — ROPINIROLE 1 MG/1
3 TABLET, FILM COATED ORAL NIGHTLY
Status: DISCONTINUED | OUTPATIENT
Start: 2024-11-18 | End: 2024-11-20 | Stop reason: HOSPADM

## 2024-11-18 RX ORDER — ARIPIPRAZOLE 10 MG/1
20 TABLET ORAL DAILY
Status: DISCONTINUED | OUTPATIENT
Start: 2024-11-19 | End: 2024-11-20 | Stop reason: HOSPADM

## 2024-11-18 RX ORDER — BUPROPION HYDROCHLORIDE 150 MG/1
300 TABLET ORAL DAILY
Status: DISCONTINUED | OUTPATIENT
Start: 2024-11-18 | End: 2024-11-20 | Stop reason: HOSPADM

## 2024-11-18 RX ORDER — ACETAMINOPHEN 500 MG
1000 TABLET ORAL EVERY 8 HOURS PRN
Status: DISCONTINUED | OUTPATIENT
Start: 2024-11-18 | End: 2024-11-20 | Stop reason: HOSPADM

## 2024-11-18 RX ORDER — BUPROPION HYDROCHLORIDE 150 MG/1
300 TABLET ORAL DAILY
COMMUNITY

## 2024-11-18 RX ORDER — POLYETHYLENE GLYCOL 3350 17 G/17G
17 POWDER, FOR SOLUTION ORAL DAILY PRN
Status: DISCONTINUED | OUTPATIENT
Start: 2024-11-18 | End: 2024-11-20 | Stop reason: HOSPADM

## 2024-11-18 RX ORDER — ROPINIROLE 1 MG/1
3 TABLET, FILM COATED ORAL NIGHTLY
Status: CANCELLED | OUTPATIENT
Start: 2024-11-18

## 2024-11-18 RX ORDER — MUPIROCIN 20 MG/G
1 OINTMENT TOPICAL 3 TIMES DAILY
Status: DISCONTINUED | OUTPATIENT
Start: 2024-11-18 | End: 2024-11-20 | Stop reason: HOSPADM

## 2024-11-18 RX ORDER — ROPINIROLE 3 MG/1
3 TABLET, FILM COATED ORAL NIGHTLY
COMMUNITY

## 2024-11-18 RX ORDER — PANTOPRAZOLE SODIUM 40 MG/1
40 TABLET, DELAYED RELEASE ORAL
Status: CANCELLED | OUTPATIENT
Start: 2024-11-19

## 2024-11-18 RX ORDER — BUPROPION HYDROCHLORIDE 150 MG/1
300 TABLET ORAL DAILY
Status: CANCELLED | OUTPATIENT
Start: 2024-11-18

## 2024-11-18 RX ORDER — METHOCARBAMOL 500 MG/1
500 TABLET, FILM COATED ORAL ONCE
Status: COMPLETED | OUTPATIENT
Start: 2024-11-18 | End: 2024-11-18

## 2024-11-18 RX ORDER — SODIUM CHLORIDE 9 MG/ML
100 INJECTION, SOLUTION INTRAVENOUS CONTINUOUS
Status: DISCONTINUED | OUTPATIENT
Start: 2024-11-18 | End: 2024-11-18

## 2024-11-18 RX ORDER — HYDROXYZINE HYDROCHLORIDE 25 MG/1
25 TABLET, FILM COATED ORAL 3 TIMES DAILY PRN
Status: DISCONTINUED | OUTPATIENT
Start: 2024-11-18 | End: 2024-11-20 | Stop reason: HOSPADM

## 2024-11-18 RX ORDER — ARIPIPRAZOLE 10 MG/1
5 TABLET ORAL DAILY
Status: DISCONTINUED | OUTPATIENT
Start: 2024-11-18 | End: 2024-11-18

## 2024-11-18 RX ORDER — GABAPENTIN 300 MG/1
300 CAPSULE ORAL 3 TIMES DAILY PRN
Status: DISCONTINUED | OUTPATIENT
Start: 2024-11-18 | End: 2024-11-18

## 2024-11-18 RX ORDER — METOPROLOL TARTRATE 1 MG/ML
5 INJECTION, SOLUTION INTRAVENOUS ONCE
Status: DISCONTINUED | OUTPATIENT
Start: 2024-11-18 | End: 2024-11-18

## 2024-11-18 RX ORDER — ARIPIPRAZOLE 20 MG/1
20 TABLET ORAL DAILY
COMMUNITY

## 2024-11-18 RX ORDER — METOPROLOL TARTRATE 25 MG/1
12.5 TABLET, FILM COATED ORAL ONCE
Status: COMPLETED | OUTPATIENT
Start: 2024-11-18 | End: 2024-11-18

## 2024-11-18 RX ORDER — ACETAMINOPHEN 500 MG
1000 TABLET ORAL ONCE
Status: COMPLETED | OUTPATIENT
Start: 2024-11-18 | End: 2024-11-18

## 2024-11-18 RX ADMIN — HYDROXYZINE HYDROCHLORIDE 25 MG: 25 TABLET ORAL at 20:19

## 2024-11-18 RX ADMIN — APIXABAN 5 MG: 5 TABLET, FILM COATED ORAL at 20:19

## 2024-11-18 RX ADMIN — MUPIROCIN 1 APPLICATION: 20 OINTMENT TOPICAL at 17:28

## 2024-11-18 RX ADMIN — BUPROPION HYDROCHLORIDE 300 MG: 150 TABLET, EXTENDED RELEASE ORAL at 17:28

## 2024-11-18 RX ADMIN — ASPIRIN 325 MG: 325 TABLET, COATED ORAL at 08:15

## 2024-11-18 RX ADMIN — ROPINIROLE HYDROCHLORIDE 3 MG: 1 TABLET, FILM COATED ORAL at 20:19

## 2024-11-18 RX ADMIN — ACETAMINOPHEN 1000 MG: 500 TABLET ORAL at 06:46

## 2024-11-18 RX ADMIN — Medication 10 ML: at 20:19

## 2024-11-18 RX ADMIN — PANTOPRAZOLE SODIUM 40 MG: 40 TABLET, DELAYED RELEASE ORAL at 17:28

## 2024-11-18 RX ADMIN — METOPROLOL TARTRATE 12.5 MG: 25 TABLET, FILM COATED ORAL at 03:38

## 2024-11-18 RX ADMIN — APIXABAN 5 MG: 5 TABLET, FILM COATED ORAL at 13:33

## 2024-11-18 RX ADMIN — SODIUM CHLORIDE 100 ML/HR: 9 INJECTION, SOLUTION INTRAVENOUS at 08:15

## 2024-11-18 RX ADMIN — ARIPIPRAZOLE 5 MG: 10 TABLET ORAL at 13:33

## 2024-11-18 RX ADMIN — MAGNESIUM SULFATE HEPTAHYDRATE 2 G: 40 INJECTION, SOLUTION INTRAVENOUS at 01:39

## 2024-11-18 RX ADMIN — ENOXAPARIN SODIUM 40 MG: 100 INJECTION SUBCUTANEOUS at 08:15

## 2024-11-18 RX ADMIN — MUPIROCIN 1 APPLICATION: 20 OINTMENT TOPICAL at 20:19

## 2024-11-18 RX ADMIN — SODIUM CHLORIDE 250 ML: 9 INJECTION, SOLUTION INTRAVENOUS at 01:39

## 2024-11-18 RX ADMIN — METHOCARBAMOL 500 MG: 500 TABLET ORAL at 06:47

## 2024-11-18 RX ADMIN — Medication 5 MG: at 20:19

## 2024-11-18 RX ADMIN — BUMETANIDE 1 MG: 0.25 INJECTION, SOLUTION INTRAMUSCULAR; INTRAVENOUS at 12:15

## 2024-11-18 NOTE — H&P
[facility]   HISTORY AND PHYSICAL    Patient Name: Brittaney Park  : 1960  MRN: [id number]  Primary Care Physician:  Jeni Nuñez APRN  Date of admission: 2024    Subjective   Subjective     Chief Complaint: Visual and auditory hallucinations    History of Present Illness the patient is a pleasant 64-year-old female with past medical history significant for intravenous drug abuse with methamphetamines, suicidal thoughts with attempt, anxiety, depression, hepatitis C and previous alcohol use who presents to the emergency department with a 2-week history of visual and auditory hallucinations.    States that she has approximately a 20-year history of intermittent intravenous drug abuse.  She states that her last use was approximately Saturday.  Patient reports that she was started on Zyprexa approximately 5 days ago by her primary care provider without improvement in her hallucinations.  She denies any suicidal and/or homicidal ideations.  Urine drug screen performed in the ED was positive for amphetamines/methamphetamines.  The intake/psychiatry nurse contacted the on-call psychiatrist via phone who reported that patient did not meet inpatient criteria.    Upon arrival to the emergency department, the patient was noted to be quite tachycardic with a heart rate of 154 bpm.  Patient with intermittent hypotension.  EKG revealed new onset atrial fibrillation with rapid ventricular response.  The patient denies any previous known history of cardiac arrhythmia.    The patient denies any recent chest pains, pressure or palpitations.  She does report some dyspnea with exertion that has been present for quite some time.  She does not report any dizziness, lightheadedness or recent falls.  Patient denies any previous known history of CHF, hypertension and/or diabetes mellitus.    Review of Systems   Constitutional:  Positive for fatigue. Negative for chills, diaphoresis and fever.   HENT:  Negative for  hearing loss and trouble swallowing.    Eyes:  Negative for discharge and visual disturbance.   Respiratory:  Positive for shortness of breath. Negative for cough and wheezing.    Cardiovascular:  Positive for leg swelling (intermittent). Negative for chest pain and palpitations.   Gastrointestinal:  Negative for abdominal pain, constipation, diarrhea, nausea and vomiting.   Endocrine: Negative for polydipsia, polyphagia and polyuria.   Genitourinary:  Negative for dysuria, frequency and hematuria.   Musculoskeletal:  Negative for gait problem, myalgias and neck pain.   Skin:  Negative for rash.   Neurological:  Positive for weakness. Negative for dizziness, tremors, seizures, syncope and light-headedness.   Hematological:  Does not bruise/bleed easily.   Psychiatric/Behavioral:  Positive for hallucinations. Negative for agitation, self-injury and suicidal ideas.         Personal History     Past Medical History:   Diagnosis Date   • Alcoholism    • Anxiety    • Chronic pain disorder    • Depression    • Hep C w/o coma, chronic    • Substance abuse    • Suicidal thoughts    • Suicide attempt     reports hx of overdose x 2 and attempt to hang self- years ago   • Withdrawal symptoms, drug or narcotic        Past Surgical History:   Procedure Laterality Date   • BACK SURGERY     • CHOLECYSTECTOMY     • HYSTERECTOMY         Family History: family history includes Depression in her mother; No Known Problems in her father; Schizophrenia in her sister.     Social History:  reports that she has never smoked. She has never used smokeless tobacco. She reports that she does not currently use alcohol. She reports current drug use. Drugs: Hydrocodone, Methamphetamines, and Cocaine(coke).    Home Medications:  naproxen and raNITIdine    Allergies:  No Known Allergies    Objective    Objective     Vitals:   Temp:  [97.3 °F (36.3 °C)] 97.3 °F (36.3 °C)  Heart Rate:  [] 96  Resp:  [18] 18  BP: ()/(36-70)  108/67    Physical Exam  Constitutional:       General: She is not in acute distress.     Appearance: She is well-developed.   HENT:      Head: Normocephalic and atraumatic.   Eyes:      Conjunctiva/sclera: Conjunctivae normal.   Neck:      Trachea: No tracheal deviation.   Cardiovascular:      Rate and Rhythm: Normal rate. Rhythm irregular.      Pulses:           Dorsalis pedis pulses are 2+ on the right side and 2+ on the left side.      Heart sounds: No murmur heard.     No friction rub. No gallop.      Comments: No significant lower extremity edema  Pulmonary:      Effort: No respiratory distress.      Breath sounds: Normal breath sounds. No wheezing or rales.   Abdominal:      General: Bowel sounds are normal. There is no distension.      Palpations: Abdomen is soft.      Tenderness: There is no abdominal tenderness. There is no guarding.   Skin:     General: Skin is warm and dry.      Findings: No erythema or rash.   Neurological:      Mental Status: She is alert and oriented to person, place, and time.      Cranial Nerves: No cranial nerve deficit.   Psychiatric:         Attention and Perception: Attention normal. She does not perceive auditory or visual hallucinations.         Mood and Affect: Mood normal.         Behavior: Behavior is cooperative.      Comments: Currently denies /         Result Review    Result Review:  I have personally reviewed the results from the time of this admission to 11/18/2024 06:44 EST and agree with these findings:  [x]  Laboratory list / accordion  []  Microbiology  [x]  Radiology  [x]  EKG/Telemetry   []  Cardiology/Vascular   []  Pathology  []  Old records  []  Other:  Most notable findings include:     EKG is currently pending official cardiology interpretation, however, per my view the patient appears to have atrial fibrillation with rapid ventricular response; no significant ST elevations        Results from last 7 days   Lab Units 11/17/24 1951   WBC 10*3/mm3 8.48    HEMOGLOBIN g/dL 10.7*   HEMATOCRIT % 36.7   PLATELETS 10*3/mm3 307     Results from last 7 days   Lab Units 11/17/24 1951   SODIUM mmol/L 141   POTASSIUM mmol/L 4.0   CHLORIDE mmol/L 104   CO2 mmol/L 24.2   BUN mg/dL 17   CREATININE mg/dL 1.41*   CALCIUM mg/dL 9.9   BILIRUBIN mg/dL 0.2   ALK PHOS U/L 101   ALT (SGPT) U/L 7   AST (SGOT) U/L 14   GLUCOSE mg/dL 84         Chest x-ray:     FINDINGS:    Lungs and pleural spaces:  See below.      Heart:  Cardiomegaly and pulmonary vascular congestion.    Mediastinum:  Unremarkable as visualized.    Bones/joints:  Unremarkable as visualized.     IMPRESSION:    Cardiomegaly and pulmonary vascular congestion.    Assessment & Plan   Assessment / Plan     #Presumed new onset atrial fibrillation presenting with rapid ventricular response  #Intermittent hypotension, asymptomatic  #Minimal troponin T elevation due to above  #Methamphetamine use disorder, severe  #Longstanding history of polysubstance use disorder  #Auditory and visual hallucinations likely due to methamphetamine use  #Morbid obesity per BMI 38.74 kg/m²  #Mild normocytic anemia  -Patient will be admitted to the telemetry unit for further evaluation and management  -Currently, patient's [id number] score equals 1  -Patient will be started on VTE prophylaxis with Lovenox for now and I will also start her on aspirin for her low [id number] score pending a formal cardiology evaluation and further workup  -I have ordered an echocardiogram  -The patient's potassium is 4.0, mag 2.0  -TSH is ordered and pending  -I have requested an HgbA1c for further assessment of her [id number] score  -I will place a formal psychiatry consult regarding patient's hallucinations and recently starting Zyprexa without effect  -Patient was counseled on stimulant use and effects on heart rate and potential to cause cardiomyopathy; patient reports understanding  -I do not see any indication for any further antibiotics, so will monitor  off therapy for now    Chronic medical conditions:  -Anxiety/depression  -Previous alcohol use  -Chronic hepatitis C  -Previous suicidal attempts    VTE Prophylaxis:  VTE dose Lovenox for now pending further workup    CODE STATUS:    Code Status (Patient has no pulse and is not breathing): CPR (Attempt to Resuscitate)  Medical Interventions (Patient has pulse or is breathing): Full Support    Admission Status:  I believe this patient meets inpatient status.    Kindra Sun, DO
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
## 2024-11-18 NOTE — PLAN OF CARE
Goal Outcome Evaluation:  Plan of Care Reviewed With: patient        Progress: no change  Outcome Evaluation: Patient admitted from ED this shift. NAD noted. IV patent. Wound care completed. No SI/HI/AVH noted ATT. Reports last use of IV meth to be 11/16/24. Patient converted to SR this shift. Cardiology aware. No new orders. POC ongoing.

## 2024-11-18 NOTE — ED PROVIDER NOTES
Subjective   History of Present Illness  Patient is a 64-year-old female history of alcoholism, intravenous drug use methamphetamine, anxiety on Zyprexa Wellbutrin Abilify being in for psychiatric evaluation.  Patient states that 2 weeks ago she relapsed and started using intravenous methamphetamine again, since then she states that she has been hearing the voices of her sisters telling her that she needs to go to rehab, and then today patient's daughter states the patient thought that another woman stole her debit card and went to the other woman's house to fight her.  The patient was started on Zyprexa 5 days ago by her primary care doctor without improvement.  Patient denies SI HI.  Patient denies fever, upper respiratory symptoms, nausea, vomiting, Headache, neck pain, chest pain, abdominal pain, focal neurologic deficit          Review of Systems   All other systems reviewed and are negative.      Past Medical History:   Diagnosis Date    Alcoholism     Anxiety     Chronic pain disorder     Depression     Hep C w/o coma, chronic     Substance abuse     Suicidal thoughts     Suicide attempt     reports hx of overdose x 2 and attempt to hang self- years ago    Withdrawal symptoms, drug or narcotic        No Known Allergies    Past Surgical History:   Procedure Laterality Date    BACK SURGERY      CHOLECYSTECTOMY      HYSTERECTOMY         Family History   Problem Relation Age of Onset    Schizophrenia Sister     Depression Mother     No Known Problems Father        Social History     Socioeconomic History    Marital status:    Tobacco Use    Smoking status: Never    Smokeless tobacco: Never   Vaping Use    Vaping status: Never Used   Substance and Sexual Activity    Alcohol use: Not Currently     Comment: Denies current use    Drug use: Yes     Types: Hydrocodone, Methamphetamines, Cocaine(coke)    Sexual activity: Not Currently           Objective   Physical Exam  Vitals and nursing note reviewed.    Constitutional:       General: She is not in acute distress.     Appearance: She is well-developed. She is not diaphoretic.   HENT:      Head: Normocephalic and atraumatic.      Right Ear: External ear normal.      Left Ear: External ear normal.      Nose: Nose normal.   Eyes:      Conjunctiva/sclera: Conjunctivae normal.      Pupils: Pupils are equal, round, and reactive to light.   Neck:      Vascular: No JVD.      Trachea: No tracheal deviation.   Cardiovascular:      Rate and Rhythm: Tachycardia present. Rhythm irregular.      Heart sounds: Normal heart sounds. No murmur heard.  Pulmonary:      Effort: Pulmonary effort is normal. No respiratory distress.      Breath sounds: Normal breath sounds. No wheezing.   Abdominal:      General: Bowel sounds are normal.      Palpations: Abdomen is soft.      Tenderness: There is no abdominal tenderness.   Musculoskeletal:         General: No deformity. Normal range of motion.      Cervical back: Normal range of motion and neck supple.   Skin:     General: Skin is warm and dry.      Coloration: Skin is not pale.      Findings: No erythema or rash.   Neurological:      Mental Status: She is alert and oriented to person, place, and time.      Cranial Nerves: No cranial nerve deficit.   Psychiatric:         Behavior: Behavior normal.         Thought Content: Thought content normal.         Procedures           ED Course  ED Course as of 11/18/24 0614   Sun Nov 17, 2024 1952 EKG interpretation  TIME: 7:28 PM  Rate: 143  Rhythm: Atrial fibrillation  Axis: Normal  T waves:  No evidence for ischemia  Does not meet STEMI criteria, Sgarbossa negative    Electronically signed by Raymond Ham DO, 11/17/24, 7:53 PM EST.   [RP]      ED Course User Index  [RP] Raymond Ham DO                                                       Medical Decision Making  Presenting Complaint: generalized weakness, hallucinations    On initial evaluation the patient was hemodynamically  stable    Differential:     Toxic metabolic etiologies such as electrolyte disturbances (Na/Ca), hypoglycemia, and uremia; acidosis states, uti, pna, infection; toxidromes of intoxication or withdrawal, hypoxemia or hypercarbia, liver disease or failure causing hepatic encephalopathy, endocrine emergencies (hyper/hypothyroidism, adrenal insufficiency), seizure, trauma, intracranial bleeds. Also considered ischemic stroke but less likely given no lateralizing neurologic findings on exam.    I do have a concern about infectious etiology because of the patient's intravenous drug use will order blood cultures and give a dose of antibiotics    EKG shows A-fib RVR, patient denies history of atrial fibrillation and most recent EKG is sinus rhythm    ED Course:   -Labs: Positive for methamphetamine amphetamine and buprenorphine, mild elevation in opponent and BMP do not suspect ACS please secondary to the A-fib or the cardiomegaly,methamphetamine use  -Imaging: Cardiomegaly and pulmonary vascular congestion on x-ray    Patient given 2 g of mag and p.o. Toprol for A-fib    Was evaluated by psychiatry/behavioral and they do not believe she requires psychiatric admission given the hallucinations are likely secondary to methamphetamine use    5:35 AM spoke with Dr. Sun hospitalist who agreed to admit the patient    Diagnosis: New Onset atrial fibrillation, cardiomegaly vascular congestion, methamphetamine use    Disposition: Admission    Problems Addressed:  Atrial fibrillation with RVR: complicated acute illness or injury  Hallucinations: complicated acute illness or injury  Methamphetamine use: complicated acute illness or injury  New onset atrial fibrillation: complicated acute illness or injury    Amount and/or Complexity of Data Reviewed  Labs: ordered.  Radiology: ordered.  ECG/medicine tests: ordered.    Risk  OTC drugs.  Prescription drug management.  Decision regarding hospitalization.        Final diagnoses:   New  onset atrial fibrillation   Methamphetamine use   Hallucinations   Atrial fibrillation with RVR       ED Disposition  ED Disposition       ED Disposition   Decision to Admit    Condition   --    Comment   --               No follow-up provider specified.       Medication List      No changes were made to your prescriptions during this visit.            Raymond Ham,   11/18/24 0614

## 2024-11-18 NOTE — PROGRESS NOTES
Patient seen and examined, chart reviewed, she presented to emergency room with visual and auditory hallucination, was incidentally noted to have A-fib.  She denies history of atrial fibrillation.  Currently rate controlled on Cardizem drip.    -Atrial fibrillation with RVR, preliminary CHADS-VASc score of 1  -History of hepatitis C  -Obesity with a BMI of 38  -Auditory and visual hallucination  -Methamphetamine use  -Minimal troponin elevation likely from A-fib with RVR  -History of anxiety and depression  -Remote history of EtOH use    Transition to beta-blocker or calcium channel blocker, follow-up 2D echo, cardiology has been requested to evaluate.  Monitor blood pressure closely.

## 2024-11-18 NOTE — NURSING NOTE
Patient presented to ED for a psych eval, but was moved to and ED bed for tachycardia. Patient reports that she was hallucinating for about 2 weeks, but it was noticeably worse 2 days ago. She denies current hallucinations. She reports that she relapsed on meth about 2 weeks ago, with her last use being 2 days ago. Patient did not realize correlation between these events, nor the possibility of hallucinations being meth induced. She denied SI/HI/AVH. Anxiety 9, depression 7. One prior admission for suicidal thoughts in 2018, but no psych admissions reported since then.     UDS positive for meth, amphetamines, and suboxone    eGFR 42.3  Hemoglobin 10.7  Neutrophil % 32.1  Lymphocyte % 54.6  Lymphocytes, absolute 4.63  proBNP 965.3  HS troponin T 14      Patient experienced AFIB RVR in ED, with a HR of 143. Received lopressor 12.5 @ 0338    Chest XRAY showed cardiomegaly and pulmonary vascular congestion

## 2024-11-18 NOTE — NURSING NOTE
Spoke to Dr. Escudero, discussed assessment and labs, patient does not meet inpatient criteria and may follow up on an outpatient basis. Hallucinations appear to be directly related to methamphetamine use. Encourage cessation, and provide with outpatient resources. [id number]
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
## 2024-11-18 NOTE — PHARMACY PATIENT ASSISTANCE
Pharmacy was asked to look into cost/coverage of eliquis and xarelto.  According to her insurance, she will have a $0 copay for either.      Thank You;  Bere Julien, PharmD  11/18/24  12:06 EST

## 2024-11-18 NOTE — CASE MANAGEMENT/SOCIAL WORK
Discharge Planning Assessment   [place]     Patient Name: Brittaney Park  MRN: [id number]  Today's Date: 11/18/2024    Admit Date: 11/17/2024    Plan: SS received consult per ns for discharge planning.  SS spoke with pt at bedside.  Pt resides at home alone at [address] [facility] and plans to return home at discharge.  Pt currently does not utilize home health or DME.  Pt's PCP is [facility] and utilizes [facility].  Pt stated no POA or Living Will.  SS noted positive UDS.  Pt denies need for Substance Abuse resources at this time.  Pt transports via private auto per family.  SS will follow and assist with discharge needs.   Discharge Needs Assessment       Row Name 11/18/24 1555       Living Environment    People in Home alone    Current Living Arrangements apartment    Primary Care Provided by self    Provides Primary Care For no one    Family Caregiver if Needed child([place]), adult    Quality of Family Relationships helpful;involved;supportive    Able to Return to Prior Arrangements yes       Transition Planning    Patient/Family Anticipates Transition to home       Discharge Needs Assessment    Equipment Currently Used at Home none                   Discharge Plan       Row Name 11/18/24 4106       Plan    Plan SS received consult per [facility] for discharge planning.  SS spoke with pt at bedside.  Pt resides at home alone at [address] [facility] and plans to return home at discharge.  Pt currently does not utilize home health or DME.  Pt's PCP is [facility] and utilizes [facility].  Pt stated no POA or Living Will.  SS noted positive UDS.  Pt denies need for Substance Abuse resources at this time.  Pt transports via private auto per family.  SS will follow and assist with discharge needs.                  Continued Care and Services - Admitted Since 11/17/2024    No active coordination exists for this encounter.       Expected Discharge Date and Time       Expected Discharge Date Expected Discharge Time    Nov  20, 2024            Demographic Summary       Row Name 11/18/24 1550       General Information    Admission Type inpatient    Referral Source nursing    Reason for Consult discharge planning    Preferred Language English                      Caroline Alonso, MIRIAMW

## 2024-11-18 NOTE — NURSING NOTE
"Patient with multiple dry scab covered circular wounds to bilateral lower extremities.  Patient reports having a boil to her leg and applying \"prid\" to help bring it to a head.  She states that the \"prid\" caused these lesions.  Patient does have one open lesion to left medial knee.  Wound is 1x1x1 cm.  Malodorous.  Wound bed with moist yellow drainage.  Order placed to cleanse this wound with wound cleanser, pack with packing strip moistened in normal saline and cover with silicone border dressing BID.  Order placed to assess dry scabs daily and leave open to air.    Mark score is 19.         11/18/24 1540   Wound 11/18/24 1539 Left medial knee other (see comments)   Placement Date/Time: 11/18/24 1539   Side: Left  Orientation: medial  Location: knee  Primary Wound Type: (c) Other (comment)  Type: (c) other (see comments)  Present on Original Admission: Yes   Pressure Injury Stage O  (not a pressure related wound)   Dressing Appearance moist drainage   Closure None   Base moist;yellow   Periwound intact;dry   Periwound Temperature warm   Periwound Skin Turgor soft   Edges open   Wound Length (cm) 1 cm   Wound Width (cm) 1 cm   Wound Depth (cm) 1 cm   Wound Surface Area (cm^2) 1 cm^2   Wound Volume (cm^3) 1 cm^3   Drainage Characteristics/Odor malodorous;yellow   Drainage Amount small       "

## 2024-11-19 ENCOUNTER — APPOINTMENT (OUTPATIENT)
Dept: CARDIOLOGY | Facility: HOSPITAL | Age: 64
End: 2024-11-19
Payer: COMMERCIAL

## 2024-11-19 LAB
ALBUMIN SERPL-MCNC: 3.3 G/DL (ref 3.5–5.2)
ALBUMIN/GLOB SERPL: 1.2 G/DL
ALP SERPL-CCNC: 78 U/L (ref 39–117)
ALT SERPL W P-5'-P-CCNC: 5 U/L (ref 1–33)
ANION GAP SERPL CALCULATED.3IONS-SCNC: 8.6 MMOL/L (ref 5–15)
AST SERPL-CCNC: 13 U/L (ref 1–32)
AV MEAN PRESS GRAD SYS DOP V1V2: 5 MMHG
AV VMAX SYS DOP: 145.5 CM/SEC
BASOPHILS # BLD MANUAL: 0.08 10*3/MM3 (ref 0–0.2)
BASOPHILS NFR BLD MANUAL: 1 % (ref 0–1.5)
BH CV ECHO MEAS - ACS: 2.2 CM
BH CV ECHO MEAS - AI P1/2T: 378 MSEC
BH CV ECHO MEAS - AO MAX PG: 8.5 MMHG
BH CV ECHO MEAS - AO ROOT DIAM: 3.6 CM
BH CV ECHO MEAS - AO V2 VTI: 31.6 CM
BH CV ECHO MEAS - AVA(I,D): 2.9 CM2
BH CV ECHO MEAS - EDV(CUBED): 114.1 ML
BH CV ECHO MEAS - EDV(MOD-SP4): 63.3 ML
BH CV ECHO MEAS - EF(MOD-SP4): 66.8 %
BH CV ECHO MEAS - ESV(CUBED): 35.9 ML
BH CV ECHO MEAS - ESV(MOD-SP4): 21 ML
BH CV ECHO MEAS - FS: 32 %
BH CV ECHO MEAS - IVS/LVPW: 0.67 CM
BH CV ECHO MEAS - IVSD: 0.7 CM
BH CV ECHO MEAS - LA DIMENSION: 3.6 CM
BH CV ECHO MEAS - LAT PEAK E' VEL: 13.1 CM/SEC
BH CV ECHO MEAS - LV DIASTOLIC VOL/BSA (35-75): 29.3 CM2
BH CV ECHO MEAS - LV MASS(C)D: 144.9 GRAMS
BH CV ECHO MEAS - LV MAX PG: 6.3 MMHG
BH CV ECHO MEAS - LV MEAN PG: 3 MMHG
BH CV ECHO MEAS - LV SYSTOLIC VOL/BSA (12-30): 9.7 CM2
BH CV ECHO MEAS - LV V1 MAX: 125.5 CM/SEC
BH CV ECHO MEAS - LV V1 VTI: 29 CM
BH CV ECHO MEAS - LVIDD: 4.9 CM
BH CV ECHO MEAS - LVIDS: 3.3 CM
BH CV ECHO MEAS - LVOT AREA: 3.1 CM2
BH CV ECHO MEAS - LVOT DIAM: 2 CM
BH CV ECHO MEAS - LVPWD: 1.05 CM
BH CV ECHO MEAS - MED PEAK E' VEL: 6.9 CM/SEC
BH CV ECHO MEAS - MR MAX PG: 43.4 MMHG
BH CV ECHO MEAS - MR MAX VEL: 328.5 CM/SEC
BH CV ECHO MEAS - MV A MAX VEL: 50.8 CM/SEC
BH CV ECHO MEAS - MV DEC SLOPE: 329 CM/SEC2
BH CV ECHO MEAS - MV E MAX VEL: 113 CM/SEC
BH CV ECHO MEAS - MV E/A: 2.22
BH CV ECHO MEAS - MV MAX PG: 4.8 MMHG
BH CV ECHO MEAS - MV MEAN PG: 2 MMHG
BH CV ECHO MEAS - MV P1/2T: 99.7 MSEC
BH CV ECHO MEAS - MV V2 VTI: 32.6 CM
BH CV ECHO MEAS - MVA(P1/2T): 2.21 CM2
BH CV ECHO MEAS - MVA(VTI): 2.8 CM2
BH CV ECHO MEAS - PA ACC TIME: 0.1 SEC
BH CV ECHO MEAS - RAP SYSTOLE: 10 MMHG
BH CV ECHO MEAS - RVSP: 50.4 MMHG
BH CV ECHO MEAS - SV(LVOT): 91.1 ML
BH CV ECHO MEAS - SV(MOD-SP4): 42.3 ML
BH CV ECHO MEAS - SVI(LVOT): 42.2 ML/M2
BH CV ECHO MEAS - SVI(MOD-SP4): 19.6 ML/M2
BH CV ECHO MEAS - TAPSE (>1.6): 2.7 CM
BH CV ECHO MEAS - TR MAX PG: 40.4 MMHG
BH CV ECHO MEAS - TR MAX VEL: 318 CM/SEC
BH CV ECHO MEASUREMENTS AVERAGE E/E' RATIO: 11.3
BILIRUB SERPL-MCNC: 0.2 MG/DL (ref 0–1.2)
BUN SERPL-MCNC: 19 MG/DL (ref 8–23)
BUN/CREAT SERPL: 12.8 (ref 7–25)
CALCIUM SPEC-SCNC: 9.3 MG/DL (ref 8.6–10.5)
CHLORIDE SERPL-SCNC: 106 MMOL/L (ref 98–107)
CO2 SERPL-SCNC: 26.4 MMOL/L (ref 22–29)
CREAT SERPL-MCNC: 1.48 MG/DL (ref 0.57–1)
DEPRECATED RDW RBC AUTO: 47.9 FL (ref 37–54)
EGFRCR SERPLBLD CKD-EPI 2021: 39.9 ML/MIN/1.73
EOSINOPHIL # BLD MANUAL: 0.17 10*3/MM3 (ref 0–0.4)
EOSINOPHIL NFR BLD MANUAL: 2 % (ref 0.3–6.2)
ERYTHROCYTE [DISTWIDTH] IN BLOOD BY AUTOMATED COUNT: 15 % (ref 12.3–15.4)
GLOBULIN UR ELPH-MCNC: 2.8 GM/DL
GLUCOSE SERPL-MCNC: 106 MG/DL (ref 65–99)
HCT VFR BLD AUTO: 30.4 % (ref 34–46.6)
HGB BLD-MCNC: 8.8 G/DL (ref 12–15.9)
HYPOCHROMIA BLD QL: ABNORMAL
LEFT ATRIUM VOLUME INDEX: 23 ML/M2
LV EF 2D ECHO EST: 65 %
LYMPHOCYTES # BLD MANUAL: 3.38 10*3/MM3 (ref 0.7–3.1)
LYMPHOCYTES NFR BLD MANUAL: 3 % (ref 5–12)
MACROCYTES BLD QL SMEAR: ABNORMAL
MAGNESIUM SERPL-MCNC: 2.2 MG/DL (ref 1.6–2.4)
MAGNESIUM SERPL-MCNC: 2.2 MG/DL (ref 1.6–2.4)
MCH RBC QN AUTO: 25 PG (ref 26.6–33)
MCHC RBC AUTO-ENTMCNC: 28.9 G/DL (ref 31.5–35.7)
MCV RBC AUTO: 86.4 FL (ref 79–97)
MONOCYTES # BLD: 0.25 10*3/MM3 (ref 0.1–0.9)
NEUTROPHILS # BLD AUTO: 4.37 10*3/MM3 (ref 1.7–7)
NEUTROPHILS NFR BLD MANUAL: 53 % (ref 42.7–76)
PLAT MORPH BLD: NORMAL
PLATELET # BLD AUTO: 250 10*3/MM3 (ref 140–450)
PMV BLD AUTO: 8.5 FL (ref 6–12)
POTASSIUM SERPL-SCNC: 4.2 MMOL/L (ref 3.5–5.2)
POTASSIUM SERPL-SCNC: 4.3 MMOL/L (ref 3.5–5.2)
PROT SERPL-MCNC: 6.1 G/DL (ref 6–8.5)
QT INTERVAL: 416 MS
QTC INTERVAL: 425 MS
RBC # BLD AUTO: 3.52 10*6/MM3 (ref 3.77–5.28)
SODIUM SERPL-SCNC: 141 MMOL/L (ref 136–145)
VARIANT LYMPHS NFR BLD MANUAL: 41 % (ref 19.6–45.3)
WBC NRBC COR # BLD AUTO: 8.25 10*3/MM3 (ref 3.4–10.8)

## 2024-11-19 PROCEDURE — 93306 TTE W/DOPPLER COMPLETE: CPT

## 2024-11-19 PROCEDURE — 83735 ASSAY OF MAGNESIUM: CPT | Performed by: HOSPITALIST

## 2024-11-19 PROCEDURE — 99232 SBSQ HOSP IP/OBS MODERATE 35: CPT | Performed by: HOSPITALIST

## 2024-11-19 PROCEDURE — 85007 BL SMEAR W/DIFF WBC COUNT: CPT | Performed by: INTERNAL MEDICINE

## 2024-11-19 PROCEDURE — 99232 SBSQ HOSP IP/OBS MODERATE 35: CPT | Performed by: SPECIALIST

## 2024-11-19 PROCEDURE — 93306 TTE W/DOPPLER COMPLETE: CPT | Performed by: INTERNAL MEDICINE

## 2024-11-19 PROCEDURE — 97161 PT EVAL LOW COMPLEX 20 MIN: CPT

## 2024-11-19 PROCEDURE — 85025 COMPLETE CBC W/AUTO DIFF WBC: CPT | Performed by: INTERNAL MEDICINE

## 2024-11-19 PROCEDURE — 80053 COMPREHEN METABOLIC PANEL: CPT | Performed by: INTERNAL MEDICINE

## 2024-11-19 PROCEDURE — 97165 OT EVAL LOW COMPLEX 30 MIN: CPT

## 2024-11-19 PROCEDURE — 84132 ASSAY OF SERUM POTASSIUM: CPT | Performed by: HOSPITALIST

## 2024-11-19 RX ORDER — GABAPENTIN 300 MG/1
300 CAPSULE ORAL 3 TIMES DAILY
Status: DISCONTINUED | OUTPATIENT
Start: 2024-11-19 | End: 2024-11-20 | Stop reason: HOSPADM

## 2024-11-19 RX ORDER — LOPERAMIDE HYDROCHLORIDE 2 MG/1
4 CAPSULE ORAL 4 TIMES DAILY PRN
Status: DISCONTINUED | OUTPATIENT
Start: 2024-11-19 | End: 2024-11-20 | Stop reason: HOSPADM

## 2024-11-19 RX ADMIN — Medication 10 ML: at 09:27

## 2024-11-19 RX ADMIN — MUPIROCIN 1 APPLICATION: 20 OINTMENT TOPICAL at 09:27

## 2024-11-19 RX ADMIN — HYDROXYZINE HYDROCHLORIDE 25 MG: 25 TABLET ORAL at 23:33

## 2024-11-19 RX ADMIN — APIXABAN 5 MG: 5 TABLET, FILM COATED ORAL at 20:19

## 2024-11-19 RX ADMIN — BUPROPION HYDROCHLORIDE 300 MG: 150 TABLET, EXTENDED RELEASE ORAL at 09:26

## 2024-11-19 RX ADMIN — ROPINIROLE HYDROCHLORIDE 3 MG: 1 TABLET, FILM COATED ORAL at 20:19

## 2024-11-19 RX ADMIN — HYDROXYZINE HYDROCHLORIDE 25 MG: 25 TABLET ORAL at 14:14

## 2024-11-19 RX ADMIN — GABAPENTIN 300 MG: 300 CAPSULE ORAL at 23:33

## 2024-11-19 RX ADMIN — MUPIROCIN 1 APPLICATION: 20 OINTMENT TOPICAL at 20:19

## 2024-11-19 RX ADMIN — ACETAMINOPHEN 1000 MG: 500 TABLET ORAL at 09:32

## 2024-11-19 RX ADMIN — PANTOPRAZOLE SODIUM 40 MG: 40 TABLET, DELAYED RELEASE ORAL at 09:26

## 2024-11-19 RX ADMIN — MUPIROCIN 1 APPLICATION: 20 OINTMENT TOPICAL at 14:57

## 2024-11-19 RX ADMIN — Medication 5 MG: at 20:19

## 2024-11-19 RX ADMIN — LOPERAMIDE HYDROCHLORIDE 4 MG: 2 CAPSULE ORAL at 14:57

## 2024-11-19 RX ADMIN — APIXABAN 5 MG: 5 TABLET, FILM COATED ORAL at 09:26

## 2024-11-19 RX ADMIN — ARIPIPRAZOLE 20 MG: 10 TABLET ORAL at 09:27

## 2024-11-19 NOTE — PHARMACY PATIENT ASSISTANCE
Pharmacy evaluated the cost of Eliquis for patient. Eliquis is covered on patient's insurance with no copay.     Thank you,    Daniela Hogue, PharmD  11/19/24  10:28 EST

## 2024-11-19 NOTE — PAYOR COMM NOTE
"[facility]  NPI:[id number]    Utilization Review  Contact: Aisha Caldwell RN  Phone: [phone]  Fax:[phone]    INITIATE INPATIENT AUTHORIZATION   ICD: I48.91    Brittaney Aguilar (64 y.o. Female)       Date of Birth   1960    Social Security Number       Address   [address] [facility] [address]    Home Phone   [phone]    MRN   [id number]       Adventism   None    Marital Status                               Admission Date   24    Admission Type   Emergency    Admitting Provider   Kindra Sun DO    Attending Provider   Nay Rod MD    Department, Room/Bed   [address], 3305/2S       Discharge Date       Discharge Disposition       Discharge Destination                                 Attending Provider: Nay Rod MD    Allergies: No Known Allergies    Isolation: None   Infection: None   Code Status: CPR    Ht: 167.6 cm (66\")   Wt: 109 kg (240 lb 11.9 oz)    Admission Cmt: None   Principal Problem: New onset a-fib [I48.91]                   Active Insurance as of 2024       Primary Coverage       Payor Plan Insurance Group Employer/Plan Group    [employer] [facility] BY ADILSON [facility] BY ADILSON [id number]       Payor Plan Address Payor Plan Phone Number Payor Plan Fax Number Effective Dates    [address] [address]   2021 - None Entered    [facility] [id number]         Subscriber Name Subscriber Birth Date Member ID       BRITTANEY AGUILAR 1960 [id number]                     Emergency Contacts        (Rel.) Home Phone Work Phone Mobile Phone    Mariella Macdonald (Daughter) [phone] -- --                 History & Physical        Kindra Sun DO at 24 0644          [facility]   HISTORY AND PHYSICAL    Patient Name: Brittaney Aguilar  : 1960  MRN: [id number]  Primary Care Physician:  Jeni Nuñez APRN  Date of admission: 2024    Subjective  Subjective     Chief " Complaint: Visual and auditory hallucinations    History of Present Illness the patient is a pleasant 64-year-old female with past medical history significant for intravenous drug abuse with methamphetamines, suicidal thoughts with attempt, anxiety, depression, hepatitis C and previous alcohol use who presents to the emergency department with a 2-week history of visual and auditory hallucinations.    States that she has approximately a 20-year history of intermittent intravenous drug abuse.  She states that her last use was approximately Saturday.  Patient reports that she was started on Zyprexa approximately 5 days ago by her primary care provider without improvement in her hallucinations.  She denies any suicidal and/or homicidal ideations.  Urine drug screen performed in the ED was positive for amphetamines/methamphetamines.  The intake/psychiatry nurse contacted the on-call psychiatrist via phone who reported that patient did not meet inpatient criteria.    Upon arrival to the emergency department, the patient was noted to be quite tachycardic with a heart rate of 154 bpm.  Patient with intermittent hypotension.  EKG revealed new onset atrial fibrillation with rapid ventricular response.  The patient denies any previous known history of cardiac arrhythmia.    The patient denies any recent chest pains, pressure or palpitations.  She does report some dyspnea with exertion that has been present for quite some time.  She does not report any dizziness, lightheadedness or recent falls.  Patient denies any previous known history of CHF, hypertension and/or diabetes mellitus.    Review of Systems   Constitutional:  Positive for fatigue. Negative for chills, diaphoresis and fever.   HENT:  Negative for hearing loss and trouble swallowing.    Eyes:  Negative for discharge and visual disturbance.   Respiratory:  Positive for shortness of breath. Negative for cough and wheezing.    Cardiovascular:  Positive for leg swelling  (intermittent). Negative for chest pain and palpitations.   Gastrointestinal:  Negative for abdominal pain, constipation, diarrhea, nausea and vomiting.   Endocrine: Negative for polydipsia, polyphagia and polyuria.   Genitourinary:  Negative for dysuria, frequency and hematuria.   Musculoskeletal:  Negative for gait problem, myalgias and neck pain.   Skin:  Negative for rash.   Neurological:  Positive for weakness. Negative for dizziness, tremors, seizures, syncope and light-headedness.   Hematological:  Does not bruise/bleed easily.   Psychiatric/Behavioral:  Positive for hallucinations. Negative for agitation, self-injury and suicidal ideas.         Personal History     Past Medical History:   Diagnosis Date    Alcoholism     Anxiety     Chronic pain disorder     Depression     Hep C w/o coma, chronic     Substance abuse     Suicidal thoughts     Suicide attempt     reports hx of overdose x 2 and attempt to hang self- years ago    Withdrawal symptoms, drug or narcotic        Past Surgical History:   Procedure Laterality Date    BACK SURGERY      CHOLECYSTECTOMY      HYSTERECTOMY         Family History: family history includes Depression in her mother; No Known Problems in her father; Schizophrenia in her sister.     Social History:  reports that she has never smoked. She has never used smokeless tobacco. She reports that she does not currently use alcohol. She reports current drug use. Drugs: Hydrocodone, Methamphetamines, and Cocaine(coke).    Home Medications:  naproxen and raNITIdine    Allergies:  No Known Allergies    Objective   Objective     Vitals:   Temp:  [97.3 °F (36.3 °C)] 97.3 °F (36.3 °C)  Heart Rate:  [] 96  Resp:  [18] 18  BP: ()/(36-70) 108/67    Physical Exam  Constitutional:       General: She is not in acute distress.     Appearance: She is well-developed.   HENT:      Head: Normocephalic and atraumatic.   Eyes:      Conjunctiva/sclera: Conjunctivae normal.   Neck:      Trachea: No  tracheal deviation.   Cardiovascular:      Rate and Rhythm: Normal rate. Rhythm irregular.      Pulses:           Dorsalis pedis pulses are 2+ on the right side and 2+ on the left side.      Heart sounds: No murmur heard.     No friction rub. No gallop.      Comments: No significant lower extremity edema  Pulmonary:      Effort: No respiratory distress.      Breath sounds: Normal breath sounds. No wheezing or rales.   Abdominal:      General: Bowel sounds are normal. There is no distension.      Palpations: Abdomen is soft.      Tenderness: There is no abdominal tenderness. There is no guarding.   Skin:     General: Skin is warm and dry.      Findings: No erythema or rash.   Neurological:      Mental Status: She is alert and oriented to person, place, and time.      Cranial Nerves: No cranial nerve deficit.   Psychiatric:         Attention and Perception: Attention normal. She does not perceive auditory or visual hallucinations.         Mood and Affect: Mood normal.         Behavior: Behavior is cooperative.      Comments: Currently denies AH/VH         Result Review   Result Review:  I have personally reviewed the results from the time of this admission to 11/18/2024 06:44 EST and agree with these findings:  [x]  Laboratory list / accordion  []  Microbiology  [x]  Radiology  [x]  EKG/Telemetry   []  Cardiology/Vascular   []  Pathology  []  Old records  []  Other:  Most notable findings include:     EKG is currently pending official cardiology interpretation, however, per my view the patient appears to have atrial fibrillation with rapid ventricular response; no significant ST elevations        Results from last 7 days   Lab Units 11/17/24 1951   WBC 10*3/mm3 8.48   HEMOGLOBIN g/dL 10.7*   HEMATOCRIT % 36.7   PLATELETS 10*3/mm3 307     Results from last 7 days   Lab Units 11/17/24 1951   SODIUM mmol/L 141   POTASSIUM mmol/L 4.0   CHLORIDE mmol/L 104   CO2 mmol/L 24.2   BUN mg/dL 17   CREATININE mg/dL 1.41*    CALCIUM mg/dL 9.9   BILIRUBIN mg/dL 0.2   ALK PHOS U/L 101   ALT (SGPT) U/L 7   AST (SGOT) U/L 14   GLUCOSE mg/dL 84         Chest x-ray:     FINDINGS:    Lungs and pleural spaces:  See below.      Heart:  Cardiomegaly and pulmonary vascular congestion.    Mediastinum:  Unremarkable as visualized.    Bones/joints:  Unremarkable as visualized.     IMPRESSION:    Cardiomegaly and pulmonary vascular congestion.    Assessment & Plan  Assessment / Plan     #Presumed new onset atrial fibrillation presenting with rapid ventricular response  #Intermittent hypotension, asymptomatic  #Minimal troponin T elevation due to above  #Methamphetamine use disorder, severe  #Longstanding history of polysubstance use disorder  #Auditory and visual hallucinations likely due to methamphetamine use  #Morbid obesity per BMI 38.74 kg/m²  #Mild normocytic anemia  -Patient will be admitted to the telemetry unit for further evaluation and management  -Currently, patient's [id number] score equals 1  -Patient will be started on VTE prophylaxis with Lovenox for now and I will also start her on aspirin for her low [id number] score pending a formal cardiology evaluation and further workup  -I have ordered an echocardiogram  -The patient's potassium is 4.0, mag 2.0  -TSH is ordered and pending  -I have requested an HgbA1c for further assessment of her [id number] score  -I will place a formal psychiatry consult regarding patient's hallucinations and recently starting Zyprexa without effect  -Patient was counseled on stimulant use and effects on heart rate and potential to cause cardiomyopathy; patient reports understanding  -I do not see any indication for any further antibiotics, so will monitor off therapy for now    Chronic medical conditions:  -Anxiety/depression  -Previous alcohol use  -Chronic hepatitis C  -Previous suicidal attempts    VTE Prophylaxis:  VTE dose Lovenox for now pending further workup    CODE STATUS:    Code Status  (Patient has no pulse and is not breathing): CPR (Attempt to Resuscitate)  Medical Interventions (Patient has pulse or is breathing): Full Support    Admission Status:  I believe this patient meets inpatient status.    Kindra Sun DO    Electronically signed by Kindra Sun DO at 11/18/24 0658          Emergency Department Notes        Raymond Ham DO at 11/17/24 1948          Subjective   History of Present Illness  Patient is a 64-year-old female history of alcoholism, intravenous drug use methamphetamine, anxiety on Zyprexa Wellbutrin Abilify being in for psychiatric evaluation.  Patient states that 2 weeks ago she relapsed and started using intravenous methamphetamine again, since then she states that she has been hearing the voices of her sisters telling her that she needs to go to rehab, and then today patient's daughter states the patient thought that another woman stole her debit card and went to the other woman's house to fight her.  The patient was started on Zyprexa 5 days ago by her primary care doctor without improvement.  Patient denies SI HI.  Patient denies fever, upper respiratory symptoms, nausea, vomiting, Headache, neck pain, chest pain, abdominal pain, focal neurologic deficit          Review of Systems   All other systems reviewed and are negative.      Past Medical History:   Diagnosis Date    Alcoholism     Anxiety     Chronic pain disorder     Depression     Hep C w/o coma, chronic     Substance abuse     Suicidal thoughts     Suicide attempt     reports hx of overdose x 2 and attempt to hang self- years ago    Withdrawal symptoms, drug or narcotic        No Known Allergies    Past Surgical History:   Procedure Laterality Date    BACK SURGERY      CHOLECYSTECTOMY      HYSTERECTOMY         Family History   Problem Relation Age of Onset    Schizophrenia Sister     Depression Mother     No Known Problems Father        Social History     Socioeconomic History    Marital  status:    Tobacco Use    Smoking status: Never    Smokeless tobacco: Never   Vaping Use    Vaping status: Never Used   Substance and Sexual Activity    Alcohol use: Not Currently     Comment: Denies current use    Drug use: Yes     Types: Hydrocodone, Methamphetamines, Cocaine(coke)    Sexual activity: Not Currently           Objective   Physical Exam  Vitals and nursing note reviewed.   Constitutional:       General: She is not in acute distress.     Appearance: She is well-developed. She is not diaphoretic.   HENT:      Head: Normocephalic and atraumatic.      Right Ear: External ear normal.      Left Ear: External ear normal.      Nose: Nose normal.   Eyes:      Conjunctiva/sclera: Conjunctivae normal.      Pupils: Pupils are equal, round, and reactive to light.   Neck:      Vascular: No JVD.      Trachea: No tracheal deviation.   Cardiovascular:      Rate and Rhythm: Tachycardia present. Rhythm irregular.      Heart sounds: Normal heart sounds. No murmur heard.  Pulmonary:      Effort: Pulmonary effort is normal. No respiratory distress.      Breath sounds: Normal breath sounds. No wheezing.   Abdominal:      General: Bowel sounds are normal.      Palpations: Abdomen is soft.      Tenderness: There is no abdominal tenderness.   Musculoskeletal:         General: No deformity. Normal range of motion.      Cervical back: Normal range of motion and neck supple.   Skin:     General: Skin is warm and dry.      Coloration: Skin is not pale.      Findings: No erythema or rash.   Neurological:      Mental Status: She is alert and oriented to person, place, and time.      Cranial Nerves: No cranial nerve deficit.   Psychiatric:         Behavior: Behavior normal.         Thought Content: Thought content normal.         Procedures          ED Course  ED Course as of 11/18/24 0614   Sun Nov 17, 2024 1952 EKG interpretation  TIME: 7:28 PM  Rate: 143  Rhythm: Atrial fibrillation  Axis: Normal  T waves:  No evidence  for ischemia  Does not meet STEMI criteria, Sgarbossa negative    Electronically signed by Raymond Ham DO, 11/17/24, 7:53 PM EST.   [RP]      ED Course User Index  [RP] Raymond Ham DO                                                       Medical Decision Making  Presenting Complaint: generalized weakness, hallucinations    On initial evaluation the patient was hemodynamically stable    Differential:     Toxic metabolic etiologies such as electrolyte disturbances (Na/Ca), hypoglycemia, and uremia; acidosis states, uti, pna, infection; toxidromes of intoxication or withdrawal, hypoxemia or hypercarbia, liver disease or failure causing hepatic encephalopathy, endocrine emergencies (hyper/hypothyroidism, adrenal insufficiency), seizure, trauma, intracranial bleeds. Also considered ischemic stroke but less likely given no lateralizing neurologic findings on exam.    I do have a concern about infectious etiology because of the patient's intravenous drug use will order blood cultures and give a dose of antibiotics    EKG shows A-fib RVR, patient denies history of atrial fibrillation and most recent EKG is sinus rhythm    ED Course:   -Labs: Positive for methamphetamine amphetamine and buprenorphine, mild elevation in opponent and BMP do not suspect ACS please secondary to the A-fib or the cardiomegaly,methamphetamine use  -Imaging: Cardiomegaly and pulmonary vascular congestion on x-ray    Patient given 2 g of mag and p.o. Toprol for A-fib    Was evaluated by psychiatry/behavioral and they do not believe she requires psychiatric admission given the hallucinations are likely secondary to methamphetamine use    5:35 AM spoke with Dr. Sun hospitalist who agreed to admit the patient    Diagnosis: New Onset atrial fibrillation, cardiomegaly vascular congestion, methamphetamine use    Disposition: Admission    Problems Addressed:  Atrial fibrillation with RVR: complicated acute illness or  injury  Hallucinations: complicated acute illness or injury  Methamphetamine use: complicated acute illness or injury  New onset atrial fibrillation: complicated acute illness or injury    Amount and/or Complexity of Data Reviewed  Labs: ordered.  Radiology: ordered.  ECG/medicine tests: ordered.    Risk  OTC drugs.  Prescription drug management.  Decision regarding hospitalization.        Final diagnoses:   New onset atrial fibrillation   Methamphetamine use   Hallucinations   Atrial fibrillation with RVR       ED Disposition  ED Disposition       ED Disposition   Decision to Admit    Condition   --    Comment   --               No follow-up provider specified.       Medication List      No changes were made to your prescriptions during this visit.            Raymond Ham DO  11/18/24 0614      Electronically signed by Raymond Ham DO at 11/18/24 0614       Olya Wray at 11/17/24 1930          EKG done 19:28, given to dr Ham at 19:30    Electronically signed by Olya Wray at 11/17/24 1930       Vital Signs (last 2 days)       Date/Time Temp Temp src Pulse Resp BP Patient Position SpO2    11/19/24 0339 98.5 (36.9) Oral -- 18 98/50 Lying --    11/18/24 2326 97.9 (36.6) Oral -- 18 120/57 Lying --    11/18/24 1913 98.6 (37) Oral -- 18 110/43 Lying --    11/18/24 1540 98.1 (36.7) Oral 65 20 105/59 Lying 92    11/18/24 1119 98.1 (36.7) Oral 83 18 103/60 Lying 90    11/18/24 0725 97.5 (36.4) Oral 97 20 112/56 Lying 93    11/18/24 0700 -- -- 98 -- 100/57 -- 91    11/18/24 0629 -- -- -- -- 108/67 Sitting --    11/18/24 0627 -- -- 96 -- 108/67 -- --    11/18/24 0600 -- -- 118 -- 73/59 -- 91    11/18/24 0513 -- -- 98 -- 93/52 -- 93    11/18/24 0500 -- -- 101 -- -- -- 93    11/18/24 0430 -- -- 114 -- -- -- 94    11/18/24 0400 -- -- 94 -- 99/36 -- 91    11/18/24 0338 -- -- 111 -- 94/64 -- --    11/18/24 0335 -- -- 105 -- 94/64 -- 92    11/18/24 0300 -- -- 90 -- 98/66 -- 93    11/18/24 0200 -- -- 106 --  94/52 -- 93    11/18/24 0130 -- -- 130 -- 101/37 -- 90    11/18/24 0100 -- -- 122 -- 104/58 -- 89    11/18/24 0030 -- -- 89 -- 99/56 -- 91    11/18/24 0013 -- -- 112 -- -- -- 94    11/18/24 0011 -- -- -- -- 108/59 -- --    11/17/24 2301 -- -- -- -- -- -- 93    11/17/24 2300 -- -- 137 -- 94/63 -- --    11/17/24 2200 -- -- 114 -- 115/66 -- 92    11/17/24 2030 -- -- 113 -- 103/66 -- 93    11/17/24 2000 -- -- 115 -- 89/70 -- 92    11/17/24 1939 -- -- 142 -- -- -- 96    11/17/24 1928 -- -- 154 -- -- -- --    11/17/24 1923 97.3 (36.3) -- -- 18 -- -- 97          Facility-Administered Medications as of 11/19/2024   Medication Dose Route Frequency Provider Last Rate Last Admin    [COMPLETED] acetaminophen (TYLENOL) tablet 1,000 mg  1,000 mg Oral Once Raymond Ham DO   1,000 mg at 11/18/24 0646    acetaminophen (TYLENOL) tablet 1,000 mg  1,000 mg Oral Q8H PRN Kindra Sun DO        apixaban (ELIQUIS) tablet 5 mg  5 mg Oral Q12H Nay Rod MD   5 mg at 11/18/24 2019    ARIPiprazole (ABILIFY) tablet 20 mg  20 mg Oral Daily Christa Krueger MD        sennosides-docusate (PERICOLACE) 8.6-50 MG per tablet 2 tablet  2 tablet Oral BID PRN iKndra Sun DO        And    polyethylene glycol (MIRALAX) packet 17 g  17 g Oral Daily PRN Kindra Sun DO        And    bisacodyl (DULCOLAX) EC tablet 5 mg  5 mg Oral Daily PRN Kindra Sun DO        And    bisacodyl (DULCOLAX) suppository 10 mg  10 mg Rectal Daily PRN Kindra Sun DO        [COMPLETED] bumetanide (BUMEX) injection 1 mg  1 mg Intravenous Once Claudette Mckeon APRN   1 mg at 11/18/24 1215    buPROPion XL (WELLBUTRIN XL) 24 hr tablet 300 mg  300 mg Oral Daily OculNay sumner MD   300 mg at 11/18/24 1728    [COMPLETED] cefepime 2000 mg IVPB in 100 mL NS (VTB)  2,000 mg Intravenous Once Raymond Ham DO   Stopped at 11/17/24 2205    hydrOXYzine (ATARAX) tablet 25 mg  25 mg Oral TID PRN Kindra Sun,  DO   25 mg at 11/18/24 2019    [COMPLETED] magnesium sulfate 2g/50 mL (PREMIX) infusion  2 g Intravenous Once Raymond Ham DO   Stopped at 11/18/24 0356    melatonin tablet 5 mg  5 mg Oral Nightly PRN Kindra Sun DO   5 mg at 11/18/24 2019    [COMPLETED] methocarbamol (ROBAXIN) tablet 500 mg  500 mg Oral Once Raymond Ham DO   500 mg at 11/18/24 0647    [COMPLETED] metoprolol tartrate (LOPRESSOR) tablet 12.5 mg  12.5 mg Oral Once Raymond Ham DO   12.5 mg at 11/18/24 0338    mupirocin (BACTROBAN) 2 % ointment 1 Application  1 Application Topical TID Nay Rod MD   1 Application at 11/18/24 2019    nitroglycerin (NITROSTAT) SL tablet 0.4 mg  0.4 mg Sublingual Q5 Min PRN Kindra Sun DO        pantoprazole (PROTONIX) EC tablet 40 mg  40 mg Oral QAM AC Nay Rod MD   40 mg at 11/18/24 1728    Pharmacy Consult   Not Applicable Continuous PRN Claudette Mckeon APRN        rOPINIRole (REQUIP) tablet 3 mg  3 mg Oral Nightly Nay Rod MD   3 mg at 11/18/24 2019    [COMPLETED] sodium chloride 0.9 % bolus 250 mL  250 mL Intravenous Once Raymond Ham DO   Stopped at 11/18/24 0209    sodium chloride 0.9 % flush 10 mL  10 mL Intravenous PRN Kindra Sun DO   10 mL at 11/18/24 2019    [COMPLETED] vancomycin IVPB 2000 mg in 0.9% Sodium Chloride 500 mL  2,000 mg Intravenous Once Raymond Ham DO   Stopped at 11/18/24 0019     Lab Results (all)       Procedure Component Value Units Date/Time    Manual Differential [[id number]]  (Abnormal) Collected: 11/19/24 0052    Specimen: Blood Updated: 11/19/24 0237     Neutrophil % 53.0 %      Lymphocyte % 41.0 %      Comment: Atypical/Reactive Lymphocytes noted.         Monocyte % 3.0 %      Eosinophil % 2.0 %      Basophil % 1.0 %      Neutrophils Absolute 4.37 10*3/mm3      Lymphocytes Absolute 3.38 10*3/mm3      Monocytes Absolute 0.25 10*3/mm3      Eosinophils Absolute 0.17 10*3/mm3       Basophils Absolute 0.08 10*3/mm3      Hypochromia Mod/2+     Macrocytes Slight/1+     Platelet Morphology Normal    CBC & Differential [[id number]]  (Abnormal) Collected: 11/19/24 0052    Specimen: Blood Updated: 11/19/24 0237    Narrative:      The following orders were created for panel order CBC & Differential.  Procedure                               Abnormality         Status                     ---------                               -----------         ------                     CBC Auto Differential[[id number]]        Abnormal            Final result               Scan Slide[[id number]]                                                                    Please view results for these tests on the individual orders.    CBC Auto Differential [[id number]]  (Abnormal) Collected: 11/19/24 0052    Specimen: Blood Updated: 11/19/24 0237     WBC 8.25 10*3/mm3      RBC 3.52 10*6/mm3      Hemoglobin 8.8 g/dL      Hematocrit 30.4 %      MCV 86.4 fL      MCH 25.0 pg      MCHC 28.9 g/dL      RDW 15.0 %      RDW-SD 47.9 fl      MPV 8.5 fL      Platelets 250 10*3/mm3     Comprehensive Metabolic Panel [[id number]]  (Abnormal) Collected: 11/19/24 0052    Specimen: Blood Updated: 11/19/24 0228     Glucose 106 mg/dL      BUN 19 mg/dL      Creatinine 1.48 mg/dL      Sodium 141 mmol/L      Potassium 4.3 mmol/L      Chloride 106 mmol/L      CO2 26.4 mmol/L      Calcium 9.3 mg/dL      Total Protein 6.1 g/dL      Albumin 3.3 g/dL      ALT (SGPT) 5 U/L      AST (SGOT) 13 U/L      Alkaline Phosphatase 78 U/L      Total Bilirubin 0.2 mg/dL      Globulin 2.8 gm/dL      A/G Ratio 1.2 g/dL      BUN/Creatinine Ratio 12.8     Anion Gap 8.6 mmol/L      eGFR 39.9 mL/min/1.73     Narrative:      GFR Normal >60  Chronic Kidney Disease <60  Kidney Failure <15      Magnesium [[id number]]  (Normal) Collected: 11/19/24 0052    Specimen: Blood Updated: 11/19/24 0228     Magnesium 2.2 mg/dL     Blood Culture - Blood, Arm, Left [[id number]]  (Normal)  Collected: 11/17/24 1958    Specimen: Blood from Arm, Left Updated: 11/18/24 2000     Blood Culture No growth at 24 hours    Blood Culture - Blood, Arm, Left [[id number]]  (Normal) Collected: 11/17/24 1958    Specimen: Blood from Arm, Left Updated: 11/18/24 2000     Blood Culture No growth at 24 hours    Lipid Panel [[id number]] Collected: 11/18/24 0931    Specimen: Blood Updated: 11/18/24 1016     Total Cholesterol 152 mg/dL      Triglycerides 98 mg/dL      HDL Cholesterol 57 mg/dL      LDL Cholesterol  77 mg/dL      VLDL Cholesterol 18 mg/dL      LDL/HDL Ratio 1.32    Narrative:      Cholesterol Reference Ranges  (U.S. Department of Health and Human Services ATP III Classifications)    Desirable          <200 mg/dL  Borderline High    200-239 mg/dL  High Risk          >240 mg/dL      Triglyceride Reference Ranges  (U.S. Department of Health and Human Services ATP III Classifications)    Normal           <150 mg/dL  Borderline High  150-199 mg/dL  High             200-499 mg/dL  Very High        >500 mg/dL    HDL Reference Ranges  ([place] Department of Health and Human Services ATP III Classifications)    Low     <40 mg/dl (major risk factor for CHD)  High    >60 mg/dl ('negative' risk factor for CHD)        LDL Reference Ranges  (U.S. Department of Health and Human Services ATP III Classifications)    Optimal          <100 mg/dL  Near Optimal     100-129 mg/dL  Borderline High  130-159 mg/dL  High             160-189 mg/dL  Very High        >189 mg/dL    Hemoglobin A1c [[id number]]  (Normal) Collected: 11/18/24 0931    Specimen: Blood Updated: 11/18/24 0955     Hemoglobin A1C 5.60 %     Narrative:      Hemoglobin A1C Ranges:    Increased Risk for Diabetes  5.7% to 6.4%  Diabetes                     >= 6.5%  Diabetic Goal                < 7.0%    TSH [[id number]]  (Abnormal) Collected: 11/17/24 1951    Specimen: Blood Updated: 11/18/24 0828     TSH 5.460 uIU/mL     Fentanyl, Urine - Straight Cath [[id number]]  (Normal)  Collected: 11/17/24 2326    Specimen: Urine from Straight Cath Updated: 11/17/24 2340     Fentanyl, Urine Negative    Narrative:      Negative Threshold:      Fentanyl 5 ng/mL     The normal value for the drug tested is negative. This report includes final unconfirmed screening results to be used for medical treatment purposes only. Unconfirmed results must not be used for non-medical purposes such as employment or legal testing. Clinical consideration should be applied to any drug of abuse test, particularly when unconfirmed results are used.           Urine Drug Screen - Straight Cath [[id number]]  (Abnormal) Collected: 11/17/24 2326    Specimen: Urine from Straight Cath Updated: 11/17/24 2339     THC, Screen, Urine Negative     Phencyclidine (PCP), Urine Negative     Cocaine Screen, Urine Negative     Methamphetamine, Ur Positive     Opiate Screen Negative     Amphetamine Screen, Urine Positive     Benzodiazepine Screen, Urine Negative     Tricyclic Antidepressants Screen Negative     Methadone Screen, Urine Negative     Barbiturates Screen, Urine Negative     Oxycodone Screen, Urine Negative     Buprenorphine, Screen, Urine Positive    Narrative:      Cutoff For Drugs Screened:    Amphetamines               500 ng/ml  Barbiturates               200 ng/ml  Benzodiazepines            150 ng/ml  Cocaine                    150 ng/ml  Methadone                  200 ng/ml  Opiates                    100 ng/ml  Phencyclidine               25 ng/ml  THC                         50 ng/ml  Methamphetamine            500 ng/ml  Tricyclic Antidepressants  300 ng/ml  Oxycodone                  100 ng/ml  Buprenorphine               10 ng/ml    The normal value for all drugs tested is negative. This report includes unconfirmed screening results, with the cutoff values listed, to be used for medical treatment purposes only.  Unconfirmed results must not be used for non-medical purposes such as employment or legal testing.   Clinical consideration should be applied to any drug of abuse test, particularly when unconfirmed results are used.      Urinalysis With Culture If Indicated - Straight Cath [[id number]]  (Normal) Collected: 11/17/24 2327    Specimen: Urine from Straight Cath Updated: 11/17/24 2332     Color, UA Yellow     Appearance, UA Clear     pH, UA 5.5     Specific Gravity, UA 1.015     Glucose, UA Negative     Ketones, UA Negative     Bilirubin, UA Negative     Blood, UA Negative     Protein, UA Negative     Leuk Esterase, UA Negative     Nitrite, UA Negative     Urobilinogen, UA 0.2 E.U./dL    Narrative:      In absence of clinical symptoms, the presence of pyuria, bacteria, and/or nitrites on the urinalysis result does not correlate with infection.  Urine microscopic not indicated.    COVID-19 and FLU A/B PCR, 1 HR TAT - Swab, Nasopharynx [[id number]]  (Normal) Collected: 11/17/24 2003    Specimen: Swab from Nasopharynx Updated: 11/17/24 2024     COVID19 Not Detected     Influenza A PCR Not Detected     Influenza B PCR Not Detected    Narrative:      Fact sheet for providers: https://www.fda.gov/media/371473/download    Fact sheet for patients: https://www.fda.gov/media/614961/download    Test performed by PCR.    Comprehensive Metabolic Panel [[id number]]  (Abnormal) Collected: 11/17/24 1951    Specimen: Blood Updated: 11/17/24 2018     Glucose 84 mg/dL      BUN 17 mg/dL      Creatinine 1.41 mg/dL      Sodium 141 mmol/L      Potassium 4.0 mmol/L      Chloride 104 mmol/L      CO2 24.2 mmol/L      Calcium 9.9 mg/dL      Total Protein 7.2 g/dL      Albumin 3.8 g/dL      ALT (SGPT) 7 U/L      AST (SGOT) 14 U/L      Alkaline Phosphatase 101 U/L      Total Bilirubin 0.2 mg/dL      Globulin 3.4 gm/dL      A/G Ratio 1.1 g/dL      BUN/Creatinine Ratio 12.1     Anion Gap 12.8 mmol/L      eGFR 42.3 mL/min/1.73     Narrative:      GFR Normal >60  Chronic Kidney Disease <60  Kidney Failure <15      Magnesium [[id number]]  (Normal)  Collected: 11/17/24 1951    Specimen: Blood Updated: 11/17/24 2018     Magnesium 2.0 mg/dL     Acetaminophen Level [[id number]]  (Normal) Collected: 11/17/24 1951    Specimen: Blood Updated: 11/17/24 2018     Acetaminophen <5.0 mcg/mL     Salicylate Level [[id number]]  (Normal) Collected: 11/17/24 1951    Specimen: Blood Updated: 11/17/24 2018     Salicylate <0.3 mg/dL     Single High Sensitivity Troponin T [[id number]]  (Abnormal) Collected: 11/17/24 1951    Specimen: Blood Updated: 11/17/24 2018     HS Troponin T 14 ng/L     Narrative:      High Sensitive Troponin T Reference Range:  <14.0 ng/L- Negative Female for AMI  <22.0 ng/L- Negative Male for AMI  >=14 - Abnormal Female indicating possible myocardial injury.  >=22 - Abnormal Male indicating possible myocardial injury.   Clinicians would have to utilize clinical acumen, EKG, Troponin, and serial changes to determine if it is an Acute Myocardial Infarction or myocardial injury due to an underlying chronic condition.         BNP [[id number]]  (Abnormal) Collected: 11/17/24 1951    Specimen: Blood Updated: 11/17/24 2015     proBNP 965.3 pg/mL     Narrative:      This assay is used as an aid in the diagnosis of individuals suspected of having heart failure. It can be used as an aid in the diagnosis of acute decompensated heart failure (ADHF) in patients presenting with signs and symptoms of ADHF to the emergency department (ED). In addition, NT-proBNP of <300 pg/mL indicates ADHF is not likely.    Age Range Result Interpretation  NT-proBNP Concentration (pg/mL:      <50             Positive            >450                   Gray                 300-450                    Negative             <300    50-75           Positive            >900                  Gray                300-900                  Negative            <300      >75             Positive            >1800                  Gray                300-1800                  Negative            <300     Lactic Acid, Plasma [[id number]]  (Normal) Collected: 11/17/24 1951    Specimen: Blood Updated: 11/17/24 2014     Lactate 2.0 mmol/L     CBC & Differential [[id number]]  (Abnormal) Collected: 11/17/24 1951    Specimen: Blood Updated: 11/17/24 2013    Narrative:      The following orders were created for panel order CBC & Differential.  Procedure                               Abnormality         Status                     ---------                               -----------         ------                     CBC Auto Differential[[id number]]        Abnormal            Final result               Scan Slide[[id number]]                                       Final result                 Please view results for these tests on the individual orders.    CBC Auto Differential [[id number]]  (Abnormal) Collected: 11/17/24 1951    Specimen: Blood Updated: 11/17/24 2013     WBC 8.48 10*3/mm3      RBC 4.22 10*6/mm3      Hemoglobin 10.7 g/dL      Hematocrit 36.7 %      MCV 87.0 fL      MCH 25.4 pg      MCHC 29.2 g/dL      RDW 14.9 %      RDW-SD 47.9 fl      MPV 8.7 fL      Platelets 307 10*3/mm3      Neutrophil % 32.1 %      Lymphocyte % 54.6 %      Monocyte % 8.4 %      Eosinophil % 3.9 %      Basophil % 0.8 %      Immature Grans % 0.2 %      Neutrophils, Absolute 2.72 10*3/mm3      Lymphocytes, Absolute 4.63 10*3/mm3      Monocytes, Absolute 0.71 10*3/mm3      Eosinophils, Absolute 0.33 10*3/mm3      Basophils, Absolute 0.07 10*3/mm3      Immature Grans, Absolute 0.02 10*3/mm3      nRBC 0.0 /100 WBC     Scan Slide [[id number]] Collected: 11/17/24 1951    Specimen: Blood Updated: 11/17/24 2013     Hypochromia Slight/1+     Large Platelets Slight/1+    [place] Draw [[id number]] Collected: 11/17/24 1951    Specimen: Blood Updated: 11/17/24 2000    Narrative:      The following orders were created for panel order [place] Draw.  Procedure                               Abnormality         Status                     ---------                                -----------         ------                     Green Top (Gel)[[id number]]                                  Final result               Lavender Top[[id number]]                                     Final result               Gold Top - SST[[id number]]                                   Final result               Light Blue Top[[id number]]                                   Final result                 Please view results for these tests on the individual orders.    Green Top (Gel) [[id number]] Collected: 11/17/24 1951    Specimen: Blood Updated: 11/17/24 2000     Extra Tube Hold for add-ons.     Comment: Auto resulted.       Lavender Top [[id number]] Collected: 11/17/24 1951    Specimen: Blood Updated: 11/17/24 2000     Extra Tube hold for add-on     Comment: Auto resulted       Gold Top - SST [[id number]] Collected: 11/17/24 1951    Specimen: Blood Updated: 11/17/24 2000     Extra Tube Hold for add-ons.     Comment: Auto resulted.       Light Blue Top [[id number]] Collected: 11/17/24 1951    Specimen: Blood Updated: 11/17/24 2000     Extra Tube Hold for add-ons.     Comment: Auto resulted             Imaging Results (All)       Procedure Component Value Units Date/Time    CT Head Without Contrast [[id number]] Collected: 11/17/24 2124     Updated: 11/17/24 2128    Narrative:      PROCEDURE: CT of the brain performed without IV contrast on November 17, 2024. The examination was performed with 3 mm axial imaging and 3 mm  sagittal and coronal reconstruction images. Total DLP 1639. The  examination was performed according to as low as reasonably achievable  dose protocol.     HISTORY: Altered mental status. Hallucinations.     COMPARISON: None.     FINDINGS:     Mild generalized brain volume loss  Mild chronic small vessel ischemic type changes in the white matter.  No acute intracranial hemorrhage, mass, infarct, or edema.  Calcified plaque along the supraclinoid ICA segments and distal right  vertebral artery  segments.  No hydrocephalus or shift of midline structures.  Minimal mucosal thickening in the ethmoid air cells.  Clear mastoid air cells.  No fracture or foreign body.       Impression:         1.  Mild generalized brain volume loss  2.  No acute intracranial hemorrhage, mass, infarct, or edema.  3.  No fracture or foreign body.  4.  Minimal mucosal thickening in the ethmoid air cells.     This report was finalized on 11/17/2024 9:26 PM by Masood Finch MD.       XR Chest AP [[id number]] Collected: 11/17/24 2047     Updated: 11/17/24 2050    Narrative:      EXAM:    XR Chest, 1 View     EXAM DATE:    11/17/2024 7:51 PM     CLINICAL HISTORY:    a fib new onset     TECHNIQUE:    Frontal view of the chest.     COMPARISON:    No relevant prior studies available.     FINDINGS:    Lungs and pleural spaces:  See below.      Heart:  Cardiomegaly and pulmonary vascular congestion.    Mediastinum:  Unremarkable as visualized.    Bones/joints:  Unremarkable as visualized.       Impression:        Cardiomegaly and pulmonary vascular congestion.        This report was finalized on 11/17/2024 8:48 PM by Dr. Murphy Vaughn MD.             ECG/EMG Results (all)       Procedure Component Value Units Date/Time    ECG 12 Lead Rhythm Change [[id number]] Collected: 11/18/24 1232     Updated: 11/18/24 1334     QT Interval 416 ms      QTC Interval 425 ms     Narrative:      Test Reason : Rhythm Change  Blood Pressure :   */*   mmHG  Vent. Rate :  63 BPM     Atrial Rate :  63 BPM     P-R Int : 154 ms          QRS Dur :  76 ms      QT Int : 416 ms       P-R-T Axes :  -8   4  66 degrees    QTcB Int : 425 ms    Sinus rhythm with marked sinus arrhythmia  Nonspecific T wave abnormality  Abnormal ECG  When compared with ECG of 18-Nov-2024 12:32, (Unconfirmed)  premature atrial complexes are no longer present    Referred By: OCULAM           Confirmed By:     Telemetry Scan [[id number]] Resulted: 11/17/24     Updated: 11/18/24 1807    ECG 12  Lead Tachycardia [[id number]] Collected: 11/17/24 1928     Updated: 11/18/24 2312     QT Interval 292 ms      QTC Interval 450 ms     Narrative:      Test Reason : Tachycardia  Blood Pressure :   */*   mmHG  Vent. Rate : 143 BPM     Atrial Rate : 357 BPM     P-R Int :   * ms          QRS Dur :  68 ms      QT Int : 292 ms       P-R-T Axes :   *  18  68 degrees    QTcB Int : 450 ms    Atrial fibrillation with rapid ventricular response  Low voltage QRS  Abnormal ECG  When compared with ECG of 17-Feb-2018 13:43,  Atrial fibrillation has replaced Sinus rhythm  Vent. rate has increased by  76 bpm  Confirmed by Rahat Shepard ([id number]) on 11/18/2024 11:12:03 PM    Referred By: SCOTT           Confirmed By: Rahat Shepard    ECG 12 Lead Rhythm Change [[id number]] Collected: 11/18/24 0918     Updated: 11/18/24 2329     QT Interval 354 ms      QTC Interval 447 ms     Narrative:      Test Reason : Rhythm Change  Blood Pressure :   */*   mmHG  Vent. Rate :  96 BPM     Atrial Rate : 340 BPM     P-R Int :   * ms          QRS Dur :  84 ms      QT Int : 354 ms       P-R-T Axes :   *  -9  90 degrees    QTcB Int : 447 ms    Atrial fibrillation  Inferior infarct , age undetermined  Abnormal ECG  When compared with ECG of 17-Nov-2024 19:28, (Unconfirmed)  Vent. rate has decreased by  47 bpm  Nonspecific T wave abnormality, worse in Anterolateral leads  Confirmed by Rahat Shepard ([id number]) on 11/18/2024 11:29:27 PM    Referred By: SHAQ           Confirmed By: Rahat Shepard          Orders (all)        Start     Ordered    11/19/24 0900  ARIPiprazole (ABILIFY) tablet 20 mg  Daily         11/18/24 1532    11/19/24 0600  CBC & Differential  Morning Draw         11/18/24 0734    11/19/24 0600  Comprehensive Metabolic Panel  Morning Draw         11/18/24 0734    11/19/24 0600  CBC & Differential  Morning Draw,   Status:  Canceled         11/18/24 1158    11/19/24 0600  Basic Metabolic Panel  Morning Draw,   Status:  Canceled          11/18/24 1158    11/19/24 0600  Magnesium  Morning Draw         11/18/24 1158    11/19/24 0600  Wound Care  Daily       11/18/24 1548    11/19/24 0600  CBC Auto Differential  PROCEDURE ONCE         11/18/24 2202    11/19/24 0235  Manual Differential  Once         11/19/24 0234    11/19/24 0158  Scan Slide  Once,   Status:  Canceled         11/19/24 0157    11/18/24 2100  rOPINIRole (REQUIP) tablet 3 mg  Nightly         11/18/24 1526    11/18/24 2000  Wound Care Other (comment) (unknown etiology)  Every 12 Hours         11/18/24 1548    11/18/24 1615  buPROPion XL (WELLBUTRIN XL) 24 hr tablet 300 mg  Daily         11/18/24 1526    11/18/24 1615  mupirocin (BACTROBAN) 2 % ointment 1 Application  3 Times Daily         11/18/24 1526    11/18/24 1615  pantoprazole (PROTONIX) EC tablet 40 mg  Every Morning Before Breakfast         11/18/24 1526    11/18/24 1526  gabapentin (NEURONTIN) capsule 300 mg  3 Times Daily PRN,   Status:  Discontinued         11/18/24 1526    11/18/24 1330  apixaban (ELIQUIS) tablet 5 mg  Every 12 Hours Scheduled         11/18/24 1236    11/18/24 1315  ARIPiprazole (ABILIFY) tablet 5 mg  Daily,   Status:  Discontinued         11/18/24 1222    11/18/24 1300  Pharmacy Consult - Pharmacy to dose  Continuous,   Status:  Discontinued         11/18/24 1204    11/18/24 1245  bumetanide (BUMEX) injection 1 mg  Once         11/18/24 1153    11/18/24 1225  ECG 12 Lead Rhythm Change  STAT         11/18/24 1224    11/18/24 1159  OT Consult: Eval & Treat ADL Performance Below Baseline  Once         11/18/24 1158    11/18/24 1158  PT Consult: Eval & Treat Functional Mobility Below Baseline  Once         11/18/24 1158    11/18/24 1147  Pharmacy Consult  Continuous PRN         11/18/24 1148    11/18/24 0900  vancomycin 750 mg/250 mL 0.9% NS IVPB ([facility])  Every 12 Hours,   Status:  Discontinued         11/17/24 2143    11/18/24 0900  Enoxaparin Sodium (LOVENOX) syringe 40 mg  Daily,   Status:  Discontinued          11/18/24 0734    11/18/24 0900  aspirin EC tablet 325 mg  Daily,   Status:  Discontinued         11/18/24 0734    11/18/24 0830  sodium chloride 0.9 % infusion  Continuous,   Status:  Discontinued         11/18/24 0734    11/18/24 0800  Oral Care  2 Times Daily       11/18/24 0734    11/18/24 0755  Red Rash Within Skin Fold Care Q12H  Every 12 Hours        Comments: - Notify Provider of Severe Rashes Within Skin Folds That May Require Antifungal Cream or Powder (Order Required)  - Wash Skin Folds With Soap & Water, Pat Dry  - Apply Light Coating of Appropriate Cream (Z Guard or Miconazole) or Powder (Miconazole) - If Ordered  - Keep Area Dry With Absorbent Material (Pillow Case, Dry Wash Cloth, Gauze), Change Every 12 Hours & As Needed  - Consult Wound Care if Rash Does Not Improve After 3 Days    11/18/24 0754    11/18/24 0755  Follow Pressure Ulcer Prevention Measures Policy  Continuous        Comments: Implement Appropriate Pressure Ulcer Prevention Measures  - Open Order Report to View Full Instructions  Enter Wound LDA & Document Assessment  Add Wound Care Plan  Add Patient Education Per Policy    11/18/24 0754    11/18/24 0755  Perineal Dermatitis Care Q12H  Every 12 Hours        Comments: - Gently Cleanse Area With Perineal Foam Cleanser or Gentle Perineal Cleanser  - Pat Dry  - Gently Apply Protective Moisture Barrier BID & After Each Incontinence Episode  - Notify Wound Care if No Improvement After 3 Days    11/18/24 0754    11/18/24 0755  Follow Pressure Ulcer Prevention Measures Policy  Continuous        Comments: Implement Appropriate Pressure Ulcer Prevention Measures  - Open Order Report to View Full Instructions  Enter Wound LDA & Document Assessment  Add Wound Care Plan  Add Patient Education Per Policy    11/18/24 0754    11/18/24 0753  Inpatient Case Management  Consult  Once        Provider:  (Not yet assigned)    11/18/24 0753    11/18/24 0753  Wound Ostomy Eval & Treat  Once     "    Comments: Chronic meth usage with itching noted.    11/18/24 0753    11/18/24 0735  ReDs Vest  Once         11/18/24 0734 11/18/24 0735  ECG 12 Lead Rhythm Change  Now & in 6 Hours      Comments: Perform 6 Hours After Last EKG (If Done)    11/18/24 0734 11/18/24 0735  Notify Physician (with Parameters)  Until Discontinued         11/18/24 0734 11/18/24 0735  Continuous Cardiac Monitoring  Continuous        Comments: Follow Standing Orders As Outlined in Process Instructions (Open Order Report to View Full Instructions)    11/18/24 0734 11/18/24 0735  Maintain IV Access  Continuous         11/18/24 0734 11/18/24 0735  Telemetry - Place Orders & Notify Provider of Results When Patient Experiences Acute Chest Pain, Dysrhythmia or Respiratory Distress  Continuous        Comments: Open Order Report to View Parameters Requiring Provider Notification    11/18/24 0734 11/18/24 0735  May Be Off Telemetry for Tests  Continuous         11/18/24 0734 11/18/24 0735  Diet: Cardiac; Healthy Heart (2-3 Na+); Fluid Consistency: Thin (IDDSI 0)  Diet Effective Now         11/18/24 0734 11/18/24 0735  Lipid Panel  Once         11/18/24 0734 11/18/24 0735  Hemoglobin A1c  Once         11/18/24 0734 11/18/24 0735  Adult Transthoracic Echo Complete W/ Cont if Necessary Per Protocol  Once         11/18/24 0734 11/18/24 0735  Inpatient Cardiology Consult  IN AM        Specialty:  Cardiology  Provider:  Bacilio Solis MD    11/18/24 0734 11/18/24 0735  TSH  Once         11/18/24 0734 11/18/24 0735  Inpatient Psychiatrist Consult  IN AM        Specialty:  Psychiatry  Provider:  Moshe Kunz MD    11/18/24 0734 11/18/24 0734  sennosides-docusate (PERICOLACE) 8.6-50 MG per tablet 2 tablet  2 Times Daily PRN        Placed in \"And\" Linked Group    11/18/24 0734 11/18/24 0734  polyethylene glycol (MIRALAX) packet 17 g  Daily PRN        Placed in \"And\" Linked Group    11/18/24 0734    " "11/18/24 0734  bisacodyl (DULCOLAX) EC tablet 5 mg  Daily PRN        Placed in \"And\" Linked Group    11/18/24 0734    11/18/24 0734  bisacodyl (DULCOLAX) suppository 10 mg  Daily PRN        Placed in \"And\" Linked Group    11/18/24 0734    11/18/24 0734  acetaminophen (TYLENOL) tablet 1,000 mg  Every 8 Hours PRN         11/18/24 0734    11/18/24 0734  melatonin tablet 5 mg  Nightly PRN         11/18/24 0734    11/18/24 0734  hydrOXYzine (ATARAX) tablet 25 mg  3 Times Daily PRN         11/18/24 0734    11/18/24 0734  nitroglycerin (NITROSTAT) SL tablet 0.4 mg  Every 5 Minutes PRN         11/18/24 0734    11/18/24 0639  Inpatient Admission  Once         11/18/24 0642    11/18/24 0639  Code Status and Medical Interventions: CPR (Attempt to Resuscitate); Full Support  Continuous         11/18/24 0642    11/18/24 0544  acetaminophen (TYLENOL) tablet 1,000 mg  Once         11/18/24 0528    11/18/24 0544  methocarbamol (ROBAXIN) tablet 500 mg  Once         11/18/24 0528    11/18/24 0311  metoprolol tartrate (LOPRESSOR) tablet 12.5 mg  Once         11/18/24 0255    11/18/24 0152  sodium chloride 0.9 % bolus 250 mL  Once         11/18/24 0136    11/18/24 0136  metoprolol tartrate (LOPRESSOR) injection 5 mg  Once,   Status:  Discontinued         11/18/24 0120    11/18/24 0136  magnesium sulfate 2g/50 mL (PREMIX) infusion  Once         11/18/24 0120    11/18/24 0026  ECG 12 Lead Tachycardia  STAT         11/18/24 0025    11/17/24 2328  Fentanyl, Urine - Straight Cath  Once         11/17/24 2327 11/17/24 2050  Urinalysis With Culture If Indicated - Straight Cath  STAT         11/17/24 2049 11/17/24 2050  Urine Drug Screen - Straight Cath  STAT         11/17/24 2049 11/17/24 2016  vancomycin IVPB 2000 mg in 0.9% Sodium Chloride 500 mL  Once        Placed in \"And\" Linked Group    11/17/24 2000 11/17/24 2016  cefepime 2000 mg IVPB in 100 mL NS (VTB)  Once         11/17/24 2000 11/17/24 2000  Pharmacy to dose " "vancomycin  Continuous PRN,   Status:  Discontinued        Placed in \"And\" Linked Group    11/17/24 2000 11/17/24 2000  CT Head Without Contrast  1 Time Imaging         11/17/24 2000 11/17/24 1956  Scan Slide  Once         11/17/24 1955 11/17/24 1949  Acetaminophen Level  STAT         11/17/24 1948 11/17/24 1949  Salicylate Level  STAT         11/17/24 1948 11/17/24 1947  COVID-19 and FLU A/B PCR, 1 HR TAT - Swab, Nasopharynx  Once         11/17/24 1946 11/17/24 1947  Undress & Gown  Once         11/17/24 1947 11/17/24 1947  Continuous Pulse Oximetry  Continuous         11/17/24 1947 11/17/24 1947  Vital Signs  Every 30 Minutes         11/17/24 1947 11/17/24 1947  Insert Peripheral IV  Once         11/17/24 1947 11/17/24 1947  [place] Draw  Once         11/17/24 1947 11/17/24 1947  Lactic Acid, Plasma  Once         11/17/24 1947 11/17/24 1947  Blood Culture - Blood, Arm, Left  Once         11/17/24 1947 11/17/24 1947  Blood Culture - Blood, Arm, Left  Once         11/17/24 1947 11/17/24 1947  Green Top (Gel)  PROCEDURE ONCE         11/17/24 1947 11/17/24 1947  Lavender Top  PROCEDURE ONCE         11/17/24 1947 11/17/24 1947  Gold Top - SST  PROCEDURE ONCE         11/17/24 1947 11/17/24 1947  Light Blue Top  PROCEDURE ONCE         11/17/24 1947 11/17/24 1946  sodium chloride 0.9 % flush 10 mL  As Needed         11/17/24 1947 11/17/24 1946  XR Chest AP  1 Time Imaging         11/17/24 1946 11/17/24 1946  Urinalysis With Culture If Indicated -  STAT,   Status:  Canceled         11/17/24 1946 11/17/24 1946  Influenza Antigen, Rapid - Swab, Nasopharynx  Once,   Status:  Canceled         11/17/24 1946 11/17/24 1945  Urine Drug Screen - Urine, Clean Catch  STAT,   Status:  Canceled         11/17/24 1946 11/17/24 1945  Single High Sensitivity Troponin T  Once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

## 2024-11-19 NOTE — PLAN OF CARE
Problem: Adult Inpatient Plan of Care  Goal: Absence of Hospital-Acquired Illness or Injury  Intervention: Prevent Skin Injury  Recent Flowsheet Documentation  Taken 11/18/2024 1904 by Priti Nunez RN  Body Position: position changed independently     Problem: Adult Inpatient Plan of Care  Goal: Absence of Hospital-Acquired Illness or Injury  Intervention: Prevent and Manage VTE (Venous Thromboembolism) Risk  Recent Flowsheet Documentation  Taken 11/18/2024 1904 by Priti Nunez RN  VTE Prevention/Management: (See MAR) other (see comments)   Goal Outcome Evaluation:

## 2024-11-19 NOTE — THERAPY EVALUATION
Acute Care - Occupational Therapy Initial Evaluation   [place]     Patient Name: Brittaney Park  : 1960  MRN: [id number]  Today's Date: 2024             Admit Date: 2024       ICD-10-CM ICD-9-CM   1. New onset atrial fibrillation  I48.91 427.31   2. Methamphetamine use  F15.10 305.70   3. Hallucinations  R44.3 780.1   4. Atrial fibrillation with RVR  I48.91 427.31     Patient Active Problem List   Diagnosis    Severe episode of recurrent major depressive disorder, without psychotic features    Alcoholism    Hep C w/o coma, chronic    Chronic pain disorder    New onset a-fib    Stimulant-induced psychotic disorder    Depressive disorder unspecified     Past Medical History:   Diagnosis Date    Alcoholism     Anxiety     Chronic pain disorder     Depression     Hep C w/o coma, chronic     Substance abuse     Suicidal thoughts     Suicide attempt     reports hx of overdose x 2 and attempt to hang self- years ago    Withdrawal symptoms, drug or narcotic      Past Surgical History:   Procedure Laterality Date    BACK SURGERY      CHOLECYSTECTOMY      HYSTERECTOMY           OT ASSESSMENT FLOWSHEET (Last 12 Hours)       OT Evaluation and Treatment       Row Name 24 1430                   OT Time and Intention    Document Type evaluation  -KR        Mode of Treatment occupational therapy  -KR        Patient Effort adequate  -KR           General Information    General Observations of Patient alert/cooperative  -KR        Prior Level of Function independent:  -KR           Living Environment    Current Living Arrangements home  -KR        People in Home alone  -KR        Primary Care Provided by self  -KR           Cognition    Affect/Mental Status (Cognition) WFL  -KR        Orientation Status (Cognition) oriented x 3  -KR        Follows Commands (Cognition) WFL  -KR           Range of Motion Comprehensive    Comment, General Range of Motion BUE WFL  -KR           Strength Comprehensive (MMT)     Comment, General Manual Muscle Testing (MMT) Assessment BUE WFL  -KR           Activities of Daily Living    BADL Assessment/Intervention --  SBA/Independent  -KR           Wound 11/18/24 0747 Bilateral midline coccyx MASD (moisture associated skin damage)    Wound - Properties Group Placement Date: 11/18/24  -WD Placement Time: 0747  -WD Side: Bilateral  -WD Orientation: midline  -WD Location: coccyx  -WD Primary Wound Type: MASD  -WD Type: MASD (moisture associated skin damage)  -WD Present on Original Admission: Y  -WD    Retired Wound - Properties Group Placement Date: 11/18/24  -WD Placement Time: 0747  -WD Present on Original Admission: Y  -WD Side: Bilateral  -WD Orientation: midline  -WD Location: coccyx  -WD Primary Wound Type: MASD  -WD Type: MASD (moisture associated skin damage)  -WD    Retired Wound - Properties Group Placement Date: 11/18/24  -WD Placement Time: 0747  -WD Present on Original Admission: Y  -WD Side: Bilateral  -WD Orientation: midline  -WD Location: coccyx  -WD Primary Wound Type: MASD  -WD Type: MASD (moisture associated skin damage)  -WD    Retired Wound - Properties Group Date first assessed: 11/18/24  -WD Time first assessed: 0747  -WD Present on Original Admission: Y  -WD Side: Bilateral  -WD Location: coccyx  -WD Primary Wound Type: MASD  -WD Type: MASD (moisture associated skin damage)  -WD       Wound 11/18/24 1539 Left medial knee other (see comments)    Wound - Properties Group Placement Date: 11/18/24  -TW Placement Time: 1539  -TW Side: Left  -TW Orientation: medial  -TW Location: knee  -TW Primary Wound Type: Other  -TW, unknown etiology  Type: other (see comments)  -TW, unknown etiology  Present on Original Admission: Y  -TW    Retired Wound - Properties Group Placement Date: 11/18/24  -TW Placement Time: 1539  -TW Present on Original Admission: Y  -TW Side: Left  -TW Orientation: medial  -TW Location: knee  -TW Primary Wound Type: Other  -TW, unknown etiology  Type: other  (see comments)  -TW, unknown etiology     Retired Wound - Properties Group Placement Date: 11/18/24  -TW Placement Time: 1539  -TW Present on Original Admission: Y  -TW Side: Left  -TW Orientation: medial  -TW Location: knee  -TW Primary Wound Type: Other  -TW, unknown etiology  Type: other (see comments)  -TW, unknown etiology     Retired Wound - Properties Group Date first assessed: 11/18/24  -TW Time first assessed: 1539  -TW Present on Original Admission: Y  -TW Side: Left  -TW Location: knee  -TW Primary Wound Type: Other  -TW, unknown etiology  Type: other (see comments)  -TW, unknown etiology        Plan of Care Review    Plan of Care Reviewed With patient  -KR           Therapy Assessment/Plan (OT)    Therapy Assessment/Plan (OT) Pt presents able to perform self care with SBA/Independent . CHAYO WFL. No further need for OT retraining at this time.  -KR                  User Key  (r) = Recorded By, (t) = Taken By, (c) = Cosigned By      Initials Name Effective Dates    TW Faviola Julien, RN 06/16/21 -     Murphy Restrepo OT 06/16/21 -     Lorraine Sanchez RN 06/27/23 -                      Occupational Therapy Education        No education to display                      OT Recommendation and Plan     Plan of Care Review  Plan of Care Reviewed With: patient  Plan of Care Reviewed With: patient        Time Calculation:     Therapy Charges for Today       Code Description Service Date Service Provider Modifiers Qty    [id number] HC OT EVAL LOW COMPLEXITY 4 11/19/2024 Murphy Alberto OT GO 1                 Murphy Alberto OT  11/19/2024

## 2024-11-19 NOTE — PROGRESS NOTES
[facility]     Patient Identification:  Name:  Brittaney Park  Age:  64 y.o.  Sex:  female  :  1960  MRN:  [id number]  Visit Number:  [id number]  Primary Care Provider:  Jeni Nuñez APRN    Length of stay:  1    Subjective: Patient seen and examined, she is now in sinus rhythm, discussed with cardiology, patient has elements of diastolic heart failure but compensated.  There is also concerns of peripheral arterial disease per cardiology.  Because of this her CHADS-VASc score is now 3.  Tolerating with anticoagulation.    Chief Complaint: Hallucination and A-fib with RVR  ----------------------------------------------------------------------------------------------------------------------  Current Hospital Meds:  apixaban, 5 mg, Oral, Q12H  ARIPiprazole, 20 mg, Oral, Daily  buPROPion XL, 300 mg, Oral, Daily  mupirocin, 1 Application, Topical, TID  pantoprazole, 40 mg, Oral, QAM AC  rOPINIRole, 3 mg, Oral, Nightly      Pharmacy Consult,       ----------------------------------------------------------------------------------------------------------------------  Vital Signs:  Temp:  [97.9 °F (36.6 °C)-98.6 °F (37 °C)] 98.1 °F (36.7 °C)  Heart Rate:  [65-67] 67  Resp:  [18-20] 18  BP: ()/(41-62) 131/62       Tele: Sinus rhythm 66 bpm      24  0500   Weight: 109 kg (240 lb) 109 kg (240 lb) 109 kg (240 lb 11.9 oz)     Body mass index is 38.86 kg/m².    Intake/Output Summary (Last 24 hours) at 2024 1147  Last data filed at 2024 0832  Gross per 24 hour   Intake 1440 ml   Output --   Net 1440 ml     Diet: Cardiac; Healthy Heart (2-3 Na+); Fluid Consistency: Thin (IDDSI 0)  ----------------------------------------------------------------------------------------------------------------------  Physical exam:  General: Comfortable,awake, alert, oriented to self, place, and time, well-developed and well-nourished.  No  respiratory distress.    Skin:  Skin is warm and dry. No rash noted. No pallor.    HENT:  Head:  Normocephalic and atraumatic.  Mouth:  Moist mucous membranes.    Eyes:  Conjunctivae and EOM are normal.  Pupils are equal, round, and reactive to light.  No scleral icterus.    Neck:  Neck supple.  No JVD present.    Pulmonary/Chest:  No respiratory distress, no wheezes, no crackles, with normal breath sounds and good air movement.  Cardiovascular:  Normal rate, regular rhythm and normal heart sounds with no murmur.  Abdominal:  Soft.  Bowel sounds are normal.  No distension and no tenderness.   Extremities:  No edema, no tenderness, and no deformity.  No red or swollen joints anywhere.  Strong pulses in all 4 extremities with no clubbing, no cyanosis, no edema.  Neurological:  Motor strength equal no obvious deficit, sensory grossly intact.   No cranial nerve deficit.  No tongue deviation.  No facial droop.  No slurred speech.    Genitourinary: No Rojas catheter  Back:  ----------------------------------------------------------------------------------------------------------------------  ----------------------------------------------------------------------------------------------------------------------  Results from last 7 days   Lab Units 11/17/24 1951   HSTROP T ng/L 14*     Results from last 7 days   Lab Units 11/19/24 0052 11/17/24 1951   LACTATE mmol/L  --  2.0   WBC 10*3/mm3 8.25 8.48   HEMOGLOBIN g/dL 8.8* 10.7*   HEMATOCRIT % 30.4* 36.7   MCV fL 86.4 87.0   MCHC g/dL 28.9* 29.2*   PLATELETS 10*3/mm3 250 307         Results from last 7 days   Lab Units 11/19/24  0052 11/17/24  1951   SODIUM mmol/L 141 141   POTASSIUM mmol/L 4.3 4.0   MAGNESIUM mg/dL 2.2 2.0   CHLORIDE mmol/L 106 104   CO2 mmol/L 26.4 24.2   BUN mg/dL 19 17   CREATININE mg/dL 1.48* 1.41*   CALCIUM mg/dL 9.3 9.9   GLUCOSE mg/dL 106* 84   ALBUMIN g/dL 3.3* 3.8   BILIRUBIN mg/dL 0.2 0.2   ALK PHOS U/L 78 101   AST (SGOT) U/L 13 14   ALT (SGPT)  "U/L 5 7   Estimated Creatinine Clearance: 49.3 mL/min (A) (by C-G formula based on SCr of 1.48 mg/dL (H)).    No results found for: \"AMMONIA\"  Results from last 7 days   Lab Units 11/18/24  0931   CHOLESTEROL mg/dL 152   TRIGLYCERIDES mg/dL 98   HDL CHOL mg/dL 57   LDL CHOL mg/dL 77     Blood Culture   Date Value Ref Range Status   11/17/2024 No growth at 24 hours  Preliminary   11/17/2024 No growth at 24 hours  Preliminary     No results found for: \"URINECX\"  No results found for: \"WOUNDCX\"  No results found for: \"STOOLCX\"    I have personally looked at the labs and they are summarized above.  ----------------------------------------------------------------------------------------------------------------------  Imaging Results (Last 24 Hours)       ** No results found for the last 24 hours. **          ----------------------------------------------------------------------------------------------------------------------  Assessment and Plan:  -Paroxysmal atrial fibrillation with RVR, CHADS2 score of 3  -Reduced ejection fraction heart failure compensated  -Obesity complicating all aspects of care  -Substance abuse disorder including intravenous methamphetamine likely is triggering factor for A-fib  -Visual and auditory hallucination likely from drug use  -Mild troponin elevation likely from A-fib with RVR  -Remote history of EtOH use  -History of anxiety and depression in the past patient nonsuicidal  -History of hepatitis C  -Renal insufficiency, no previous baseline renal function available except for 2018 which her creatinine was 1.1.    Monitor electrolytes, patient off Cardizem drip, now on Eliquis, ambulate the patient and monitor cardiac rhythm, counseling regarding illicit drug use patient seems to be receptive.  Patient is no longer hallucinating, no indication for transfer to [facility] at this time.  Avoid nephrotoxic medication, monitor electrolytes.      Disposition pending      Nay Rod, " MD  11/19/24  11:47 EST

## 2024-11-19 NOTE — PLAN OF CARE
Goal Outcome Evaluation:   Patient resting well during shift. Patient remains Alert and oriented x4 no hallucinations present. No distress noted. Patient able to ambulate in the quick multiple times during shift. No complaints or concerns at this time. Plan of care reviewed with the patient no questions at this time.

## 2024-11-19 NOTE — NURSING NOTE
Patient offered a dressing change to leg. Patient refused it at this time and said she will get it done later during the day.

## 2024-11-19 NOTE — THERAPY EVALUATION
Acute Care - Physical Therapy Initial Evaluation   Jose     Patient Name: Brittaney Park  : 1960  MRN: [id number]  Today's Date: 2024      Visit Dx:     ICD-10-CM ICD-9-CM   1. New onset atrial fibrillation  I48.91 427.31   2. Methamphetamine use  F15.10 305.70   3. Hallucinations  R44.3 780.1   4. Atrial fibrillation with RVR  I48.91 427.31     Patient Active Problem List   Diagnosis    Severe episode of recurrent major depressive disorder, without psychotic features    Alcoholism    Hep C w/o coma, chronic    Chronic pain disorder    New onset a-fib    Stimulant-induced psychotic disorder    Depressive disorder unspecified     Past Medical History:   Diagnosis Date    Alcoholism     Anxiety     Chronic pain disorder     Depression     Hep C w/o coma, chronic     Substance abuse     Suicidal thoughts     Suicide attempt     reports hx of overdose x 2 and attempt to hang self- years ago    Withdrawal symptoms, drug or narcotic      Past Surgical History:   Procedure Laterality Date    BACK SURGERY      CHOLECYSTECTOMY      HYSTERECTOMY       PT Assessment (Last 12 Hours)       PT Evaluation and Treatment       Row Name 24 1338          Physical Therapy Time and Intention    Subjective Information no complaints  -KM     Document Type evaluation  -KM     Mode of Treatment individual therapy;physical therapy  -KM     Patient Effort good  -KM     Symptoms Noted During/After Treatment none  -KM       Row Name 24 1337          General Information    Patient Profile Reviewed yes  -KM     Patient Observations alert;cooperative;agree to therapy  -KM     Prior Level of Function independent:;all household mobility;ADL's  -KM     Existing Precautions/Restrictions no known precautions/restrictions  -KM     Risks Reviewed patient:;LOB;nausea/vomiting;dizziness;increased discomfort  -KM     Benefits Reviewed patient:;improve function;increase independence;increase strength;increase balance  -KM      Barriers to Rehab none identified  -KM       Row Name 11/19/24 1338          Living Environment    Current Living Arrangements apartment  -KM     People in Home alone  daughter lives in apartment above patient  -KM     Primary Care Provided by self  -KM       Row Name 11/19/24 1338          Home Use of Assistive/Adaptive Equipment    Equipment Currently Used at Home none  -KM       Row Name 11/19/24 1338          Cognition    Affect/Mental Status (Cognition) WFL  -KM     Orientation Status (Cognition) oriented x 3  -KM     Follows Commands (Cognition) WFL  -KM       Row Name 11/19/24 1338          Range of Motion (ROM)    Range of Motion bilateral lower extremities;ROM is L  -KM       Row Name 11/19/24 1338          Strength (Manual Muscle Testing)    Strength (Manual Muscle Testing) bilateral lower extremities;strength is [facility]  -       Row Name 11/19/24 1338          Bed Mobility    Bed Mobility bed mobility (all) activities  -KM     All Activities, [place] (Bed Mobility) independent  -KM       Row Name 11/19/24 1338          Transfers    Transfers sit-stand transfer;stand-sit transfer  -KM       Row Name 11/19/24 1338          Sit-Stand Transfer    Sit-Stand [place] (Transfers) independent  -KM       Row Name 11/19/24 1338          Stand-Sit Transfer    Stand-Sit [place] (Transfers) independent  -KM       Row Name 11/19/24 1338          Gait/Stairs (Locomotion)    Gait/Stairs Locomotion gait/ambulation independence;distance ambulated  -KM     [place] Level (Gait) supervision  -KM     Patient was able to Ambulate yes  -KM     Distance in Feet (Gait) 200  -KM     Pattern (Gait) step-through  -KM       Row Name 11/19/24 1338          Balance    Balance Assessment sitting static balance;standing dynamic balance  -KM     Static Sitting Balance independent  -KM     Position, Sitting Balance sitting edge of bed  -KM     Dynamic Standing Balance supervision  -KM       Row Name             Wound  11/18/24 0747 Bilateral midline coccyx MASD (moisture associated skin damage)    Wound - Properties Group Placement Date: 11/18/24  -WD Placement Time: 0747  -WD Side: Bilateral  -WD Orientation: midline  -WD Location: coccyx  -WD Primary Wound Type: MASD  -WD Type: MASD (moisture associated skin damage)  -WD Present on Original Admission: Y  -WD    Retired Wound - Properties Group Placement Date: 11/18/24  -WD Placement Time: 0747  -WD Present on Original Admission: Y  -WD Side: Bilateral  -WD Orientation: midline  -WD Location: coccyx  -WD Primary Wound Type: MASD  -WD Type: MASD (moisture associated skin damage)  -WD    Retired Wound - Properties Group Placement Date: 11/18/24  -WD Placement Time: 0747  -WD Present on Original Admission: Y  -WD Side: Bilateral  -WD Orientation: midline  -WD Location: coccyx  -WD Primary Wound Type: MASD  -WD Type: MASD (moisture associated skin damage)  -WD    Retired Wound - Properties Group Date first assessed: 11/18/24  -WD Time first assessed: 0747  -WD Present on Original Admission: Y  -WD Side: Bilateral  -WD Location: coccyx  -WD Primary Wound Type: MASD  -WD Type: MASD (moisture associated skin damage)  -WD      Row Name             Wound 11/18/24 1539 Left medial knee other (see comments)    Wound - Properties Group Placement Date: 11/18/24  -TW Placement Time: 1539  -TW Side: Left  -TW Orientation: medial  -TW Location: knee  -TW Primary Wound Type: Other  -TW, unknown etiology  Type: other (see comments)  -TW, unknown etiology  Present on Original Admission: Y  -TW    Retired Wound - Properties Group Placement Date: 11/18/24  -TW Placement Time: 1539  -TW Present on Original Admission: Y  -TW Side: Left  -TW Orientation: medial  -TW Location: knee  -TW Primary Wound Type: Other  -TW, unknown etiology  Type: other (see comments)  -TW, unknown etiology     Retired Wound - Properties Group Placement Date: 11/18/24  -TW Placement Time: 1539  -TW Present on Original  Admission: Y  -TW Side: Left  -TW Orientation: medial  -TW Location: knee  -TW Primary Wound Type: Other  -TW, unknown etiology  Type: other (see comments)  -TW, unknown etiology     Retired Wound - Properties Group Date first assessed: 11/18/24  -TW Time first assessed: 1539 -TW Present on Original Admission: Y  -TW Side: Left  -TW Location: knee  -TW Primary Wound Type: Other  -TW, unknown etiology  Type: other (see comments)  -TW, unknown etiology       Row Name 11/19/24 1338          Plan of Care Review    Plan of Care Reviewed With patient  -KM     Outcome Evaluation Pt. evaluation completed during PT session. She was able to perform functional mobility skills independently. She ambulated increased distance w/ no AD. She tolerated session w/ no complaints. Pt. does not require skilled PT services at this time.  -       Row Name 11/19/24 133          Therapy Assessment/Plan (PT)    Functional Level at Time of Evaluation (PT) independent  -     Criteria for Skilled Interventions Met (PT) no;does not meet criteria for skilled intervention;no problems identified which require skilled intervention  -     Therapy Frequency (PT) evaluation only  -       Row Name 11/19/24 1335          Therapy Plan Review/Discharge Plan (PT)    Therapy Plan Review (PT) evaluation/treatment results reviewed;patient  -KM               User Key  (r) = Recorded By, (t) = Taken By, (c) = Cosigned By      Initials Name Provider Type    Faviola Flowers, RN Registered Nurse    Lorraine Sanchez RN Registered Nurse    Tapan Oropeza, PT Physical Therapist                    Physical Therapy Education       Title: PT OT SLP Therapies (Done)       Topic: Physical Therapy (Done)       Point: Mobility training (Done)       Learning Progress Summary            Patient Acceptance, E,TB, VU by  at 11/19/2024 1348                      Point: Home exercise program (Done)       Learning Progress Summary            Patient  Acceptance, E,TB, VU by  at 11/19/2024 1348                      Point: Body mechanics (Done)       Learning Progress Summary            Patient Acceptance, E,TB, VU by  at 11/19/2024 1348                      Point: Precautions (Done)       Learning Progress Summary            Patient Acceptance, E,TB, VU by  at 11/19/2024 1348                                      User Key       Initials Effective Dates Name Provider Type Discipline     05/24/22 -  Tapan Garcia, PT Physical Therapist PT                  PT Recommendation and Plan  Anticipated Discharge Disposition (PT): home, home with assist  Therapy Frequency (PT): evaluation only  Plan of Care Reviewed With: patient  Outcome Evaluation: Pt. evaluation completed during PT session. She was able to perform functional mobility skills independently. She ambulated increased distance w/ no AD. She tolerated session w/ no complaints. Pt. does not require skilled PT services at this time.       Time Calculation:    PT Charges       Row Name 11/19/24 1337             Time Calculation    PT Received On 11/19/24  -                User Key  (r) = Recorded By, (t) = Taken By, (c) = Cosigned By      Initials Name Provider Type     Tapan Garcia, PT Physical Therapist                  Therapy Charges for Today       Code Description Service Date Service Provider Modifiers Qty    77337287818 HC PT EVAL LOW COMPLEXITY 4 11/19/2024 Tapan Garcia, PT GP 1            PT G-Codes  AM-PAC 6 Clicks Score (PT): 22    Tapan Garcia PT  11/19/2024
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
## 2024-11-20 ENCOUNTER — READMISSION MANAGEMENT (OUTPATIENT)
Dept: CALL CENTER | Facility: HOSPITAL | Age: 64
End: 2024-11-20
Payer: COMMERCIAL

## 2024-11-20 VITALS
OXYGEN SATURATION: 95 % | TEMPERATURE: 98.1 F | SYSTOLIC BLOOD PRESSURE: 123 MMHG | HEIGHT: 66 IN | BODY MASS INDEX: 40.21 KG/M2 | HEART RATE: 66 BPM | DIASTOLIC BLOOD PRESSURE: 62 MMHG | WEIGHT: 250.22 LBS | RESPIRATION RATE: 18 BRPM

## 2024-11-20 LAB
ANION GAP SERPL CALCULATED.3IONS-SCNC: 9.5 MMOL/L (ref 5–15)
BUN SERPL-MCNC: 15 MG/DL (ref 8–23)
BUN/CREAT SERPL: 13.5 (ref 7–25)
CALCIUM SPEC-SCNC: 9.4 MG/DL (ref 8.6–10.5)
CHLORIDE SERPL-SCNC: 106 MMOL/L (ref 98–107)
CO2 SERPL-SCNC: 25.5 MMOL/L (ref 22–29)
CREAT SERPL-MCNC: 1.11 MG/DL (ref 0.57–1)
EGFRCR SERPLBLD CKD-EPI 2021: 56.3 ML/MIN/1.73
GLUCOSE SERPL-MCNC: 111 MG/DL (ref 65–99)
POTASSIUM SERPL-SCNC: 4 MMOL/L (ref 3.5–5.2)
SODIUM SERPL-SCNC: 141 MMOL/L (ref 136–145)

## 2024-11-20 PROCEDURE — 99239 HOSP IP/OBS DSCHRG MGMT >30: CPT | Performed by: HOSPITALIST

## 2024-11-20 PROCEDURE — 80048 BASIC METABOLIC PNL TOTAL CA: CPT | Performed by: HOSPITALIST

## 2024-11-20 RX ADMIN — ARIPIPRAZOLE 20 MG: 10 TABLET ORAL at 09:04

## 2024-11-20 RX ADMIN — BUPROPION HYDROCHLORIDE 300 MG: 150 TABLET, EXTENDED RELEASE ORAL at 09:04

## 2024-11-20 RX ADMIN — Medication 10 ML: at 09:04

## 2024-11-20 RX ADMIN — MUPIROCIN 1 APPLICATION: 20 OINTMENT TOPICAL at 09:04

## 2024-11-20 RX ADMIN — GABAPENTIN 300 MG: 300 CAPSULE ORAL at 09:04

## 2024-11-20 RX ADMIN — PANTOPRAZOLE SODIUM 40 MG: 40 TABLET, DELAYED RELEASE ORAL at 05:44

## 2024-11-20 RX ADMIN — APIXABAN 5 MG: 5 TABLET, FILM COATED ORAL at 09:04

## 2024-11-20 RX ADMIN — LOPERAMIDE HYDROCHLORIDE 4 MG: 2 CAPSULE ORAL at 10:39

## 2024-11-20 NOTE — PLAN OF CARE
Goal Outcome Evaluation:   Patient to be discharged on this date. Patient remains on room air. Family to provide discharge home. All belongings with patient. No distress at this time.

## 2024-11-20 NOTE — NURSING NOTE
Pt being discharged home today on room air. TABATHA Griffiths made aware the discharge is complete. Family to transport home.

## 2024-11-20 NOTE — DISCHARGE SUMMARY
[facility] HOSPITAL[facility] MEDICINE DISCHARGE SUMMARY    Patient Identification:  Name:  Brittaney Park  Age:  64 y.o.  Sex:  female  :  1960  MRN:  [id number]  Visit Number:  [id number]    Date of Admission: 2024  Date of Discharge: 2020  DISCHARGE DISPOSITION   Stable  PCP: Jeni Nuñez APRN    DISCHARGE DIAGNOSIS : New onset paroxysmal A-fib with RVR CHADS-VASc score of 3, IV methamphetamine use, history of hepatitis C, obesity with a BMI of 38, auditory and visual hallucinations suspect secondary to more than a week of daily IV methamphetamine use no recurrence, history of anxiety and depression, remote history of EtOH use, mild troponin elevation likely from A-fib with RVR, normocytic anemia.    HOSPITAL COURSE  Patient is a 64 y.o. female presented to [facility] complaining of auditory and visual hallucinations, without suicidal ideation.  Initially at the emergency room he was evaluated by psychiatry for possible inpatient admission for hallucination but was noted to have A-fib with RVR.  Patient denies history of atrial fibrillation, because of the RVR patient was admitted to [facility].  Her electrolytes- unremarkable, troponin was mildly elevated.  Patient denies chest discomfort.  On further inquiry patient reported daily IV amphetamine use for almost a month.  Psychiatry was consulted and suspected hallucination is drug-induced, since admission there is no recurrence of hallucinations, psychiatry does not think the patient would require or benefit inpatient psychiatry treatment.  Cardiology was consulted, 2D echo shows normal EF, per cardiology evaluation patient appears to have diastolic heart failure on echo, and assessed that patient's heart score is 3.  They recommended anticoagulation which the patient tolerated well.  Please note that when the patient was admitted she converted back to sinus and has been stable since then.  Cardiology also  recommended outpatient sleep study to rule out sleep apnea.  Discussion at length with patient the importance of illicit drug use and deleterious effect including cardiac arrhythmia the patient seems to be receptive.  Plan is to discharge her home today she will follow-up with her primary care [facility] for posthospital follow-up, sleep study, cardiology follow-up appointment arranged.      Patient was counseled to seek medical help immediately should she develop palpitation or lightheadedness or possible recurrence of A-fib with RVR.    VITAL SIGNS:      11/17/24  1924 11/19/24  0500 11/20/24  0500   Weight: 109 kg (240 lb) 109 kg (240 lb 11.9 oz) 113 kg (250 lb 3.6 oz)     Body mass index is 40.39 kg/m².  Vitals:    11/20/24 0907   BP:    Pulse: 84   Resp:    Temp:    SpO2: 95%     PHYSICAL EXAM:  General: Comfortable,awake, alert, oriented to self, place, and time, well-developed and well-nourished.  No respiratory distress.    Skin:  Skin is warm and dry. No rash noted. No pallor.    HENT:  Head:  Normocephalic and atraumatic.  Mouth:  Moist mucous membranes.    Eyes:  Conjunctivae and EOM are normal.  Pupils are equal, round, and reactive to light.  No scleral icterus.    Neck:  Neck supple.  No JVD present.  No hepatojugular reflux  Pulmonary/Chest:  No respiratory distress, no wheezes, no crackles, with normal breath sounds and good air movement.  Cardiovascular:  Normal rate, regular rhythm and normal heart sounds with no murmur.  Abdominal:  Soft.  Bowel sounds are normal.  No distension and no tenderness.   Extremities:  No edema, no tenderness, and no deformity.  No red or swollen joints anywhere.  Strong pulses in all 4 extremities with no clubbing, no cyanosis, no edema.  Neurological:  Motor strength equal no obvious deficit, sensory grossly intact.   No cranial nerve deficit.  No tongue deviation.  No facial droop.  No slurred speech.    Genitourinary: No Rojas catheter  Back:  ----------    DISCHARGE  MEDICATIONS:     Discharge Medications        New Medications        Instructions Start Date   apixaban 5 MG tablet tablet  Commonly known as: ELIQUIS   5 mg, Oral, Every 12 Hours Scheduled             Continue These Medications        Instructions Start Date   Abilify 20 MG tablet  Generic drug: ARIPiprazole   20 mg, Daily      buPROPion  MG 24 hr tablet  Commonly known as: WELLBUTRIN XL   300 mg, Daily      gabapentin 300 MG capsule  Commonly known as: NEURONTIN   300 mg, 3 Times Daily PRN      mupirocin 2 % ointment  Commonly known as: BACTROBAN   1 Application, 3 Times Daily      omeprazole 20 MG capsule  Commonly known as: priLOSEC   20 mg, Daily      rOPINIRole 3 MG tablet  Commonly known as: REQUIP   3 mg, Nightly             Stop These Medications      bumetanide 0.5 MG tablet  Commonly known as: BUMEX     OLANZapine 10 MG tablet  Commonly known as: zyPREXA                    Additional Instructions for the Follow-ups that You Need to Schedule       Discharge Follow-up with PCP   As directed       Currently Documented PCP:    Jeni Nuñez APRN    PCP Phone Number:    [phone]     Follow Up Details: SIOBHAN Santana (posthospital follow-up next week)        Discharge Follow-up with Specified Provider: Cardiology; 3 Weeks   As directed      To: Cardiology   Follow Up: 3 Weeks   Follow Up Details: New onset A-fib paroxysmal               Follow-up Information       Bacilio Solis MD Follow up in 2 week(s).    Specialties: Internal Medicine, Cardiology  Why: Hospital follow-up  Contact information:  [address]  [place] [address] [phone]               Jeni Nuñez APRN .    Specialty: Emergency Medicine  Why: SIOBHAN Santana (posthospital follow-up next week)  Contact information:  [address]  [place] [address] [phone]                             Pending Labs       Order Current Status    Blood Culture - Blood, Arm, Left Preliminary result    Blood Culture -  Blood, Arm, Left Preliminary result             Nay Rod MD  11/20/24  09:45 EST    Please note that this discharge summary required more than 30 minutes to complete.

## 2024-11-21 NOTE — OUTREACH NOTE
Prep Survey      Flowsheet Row Responses   [facility] facility [facility] discharged from? [facility]   Is LACE score < 7 ? No   Eligibility Readm Mgmt   Discharge diagnosis New onset a-fib   Does the patient have one of the following disease processes/diagnoses(primary or secondary)? Other   Does the patient have Home health ordered? No   Is there a DME ordered? No   Prep survey completed? Yes            Taylor ZHENG - Registered Nurse

## 2024-11-22 LAB
BACTERIA SPEC AEROBE CULT: NORMAL
BACTERIA SPEC AEROBE CULT: NORMAL

## 2024-11-26 ENCOUNTER — READMISSION MANAGEMENT (OUTPATIENT)
Dept: CALL CENTER | Facility: HOSPITAL | Age: 64
End: 2024-11-26
Payer: COMMERCIAL

## 2024-11-26 NOTE — OUTREACH NOTE
Medical Week 1 Survey      Flowsheet Row Responses   [facility] facility patient discharged from? [facility]   Does the patient have one of the following disease processes/diagnoses(primary or secondary)? Other   Week 1 attempt successful? No   Unsuccessful attempts Attempt 1            Lata FRANCOIS - Registered Nurse

## 2024-11-27 NOTE — PROGRESS NOTES
"Enter Query Response Below      Query Response: Unable to determine              If applicable, please update the problem list.   Patient: Brittaney Park        : 1960  Account: [id number]           Admit Date: 2024        How to Respond to this query:       a. Click New Note     b. Answer query within the yellow box.                c. Update the Problem List, if applicable.    If you have any questions about this query contact me at: [email or web address]     Dr. Sun,     63 yo female admitted per H&P dated 24 for \"new onset atrial fibrillation.\"  Risk Factors: methamphetamine use disorder.  Clinical indicators: Cardiology consulted. Progress note 24 includes \"Acute HFpEF likely secondary to atrial fibrillation with RVR.\"  ECHO 24 \"Left ventricular systolic function is normal.Estimated left ventricular EF = 65%. Left ventricular diastolic function is consistent with (grade II w/high LAP) pseudonormalization.\"   Average E/e ratio 13.1  Chest Xray 24 \"Cardiomegaly and pulmonary vascular congestion.\"  ProBNP .3  Treatment: Cardiology consultation. IV Bumex. Discharge medications included Eliquis.    Please clarify the following:    Acute HFpEF present on admission  Acute HFpEF developed during admission  Other- specify______  Unable to determine    By submitting this query, we are merely seeking further clarification of documentation to accurately reflect all conditions that you are monitoring, evaluating, treating or that extend the hospitalization or utilize additional resources of care. Please utilize your independent clinical judgment when addressing the question(s) above.     This query and your response, once completed, will be entered into the legal medical record.    Sincerely,  Namrata FARIAS RN CCDS  System Director Clinical Documentation Integrity Program     "

## 2024-12-04 ENCOUNTER — OFFICE VISIT (OUTPATIENT)
Dept: CARDIOLOGY | Facility: CLINIC | Age: 64
End: 2024-12-04
Payer: COMMERCIAL

## 2024-12-04 VITALS
DIASTOLIC BLOOD PRESSURE: 79 MMHG | SYSTOLIC BLOOD PRESSURE: 118 MMHG | OXYGEN SATURATION: 98 % | HEART RATE: 67 BPM | BODY MASS INDEX: 38.57 KG/M2 | RESPIRATION RATE: 16 BRPM | HEIGHT: 66 IN | WEIGHT: 240 LBS

## 2024-12-04 DIAGNOSIS — I50.32 CHRONIC HEART FAILURE WITH PRESERVED EJECTION FRACTION (HFPEF): ICD-10-CM

## 2024-12-04 DIAGNOSIS — I48.91 NEW ONSET A-FIB: Primary | ICD-10-CM

## 2024-12-04 DIAGNOSIS — I21.4 NSTEMI (NON-ST ELEVATED MYOCARDIAL INFARCTION): ICD-10-CM

## 2024-12-04 DIAGNOSIS — R06.83 SNORING: ICD-10-CM

## 2024-12-04 DIAGNOSIS — I65.21 CAROTID ATHEROSCLEROSIS, RIGHT: ICD-10-CM

## 2024-12-04 DIAGNOSIS — Z91.89 AT RISK FOR OBSTRUCTIVE SLEEP APNEA: ICD-10-CM

## 2024-12-04 RX ORDER — ATORVASTATIN CALCIUM 10 MG/1
10 TABLET, FILM COATED ORAL DAILY
Qty: 30 TABLET | Refills: 11 | Status: SHIPPED | OUTPATIENT
Start: 2024-12-04

## 2024-12-04 RX ORDER — BUMETANIDE 1 MG/1
1 TABLET ORAL DAILY PRN
Qty: 30 TABLET | Refills: 4 | Status: SHIPPED | OUTPATIENT
Start: 2024-12-04

## 2024-12-04 RX ORDER — BUPRENORPHINE HYDROCHLORIDE AND NALOXONE HYDROCHLORIDE DIHYDRATE 8; 2 MG/1; MG/1
1 TABLET SUBLINGUAL 2 TIMES DAILY
COMMUNITY

## 2024-12-04 NOTE — PROGRESS NOTES
Yair, Pura Vallejo, APRN  No ref. provider found    Brittaney Park  1960 12/04/2024    Subjective     Brittaney Park is a 64 y.o. female who presents today to [facility] CARDIOLOGY for Follow-up (Hosp stay) and Med Management (verbal).    History of Present Illness:    64-year-old female with history of major depressive disorder, substance use disorder in remission comes in today for hospital follow-up visit and to establish care.     Patient reports that since she has been out of the hospital she went on Suboxone and has been staying sober.  She continues to be on Eliquis 5 mg p.o. twice daily for the episode of A-fib.  She denies having any high risk bleeding.  Was previously taking Lasix 20 mg p.o. twice daily.  Was given a dose of Bumex in the hospital to which she responded really well.  During the hospitalization she was noted to have borderline elevated troponin likely NSTEMI type II.  Patient also reports snoring at night and having apneic spells.  She does report having orthopnea as well.       No Known Allergies:    Current Outpatient Medications:     apixaban (ELIQUIS) 5 MG tablet tablet, Take 1 tablet by mouth 2 (Two) Times a Day. Indications: Atrial Fibrillation, Disp: 60 tablet, Rfl: 3    ARIPiprazole (Abilify) 20 MG tablet, Take 1 tablet by mouth Daily., Disp: , Rfl:     buprenorphine-naloxone (SUBOXONE) 8-2 MG per SL tablet, Place 1 tablet under the tongue 2 (Two) Times a Day., Disp: , Rfl:     buPROPion XL (WELLBUTRIN XL) 150 MG 24 hr tablet, Take 2 tablets by mouth Daily., Disp: , Rfl:     gabapentin (NEURONTIN) 300 MG capsule, Take 1 capsule by mouth 3 (Three) Times a Day As Needed (nerve pain)., Disp: , Rfl:     omeprazole (priLOSEC) 20 MG capsule, Take 1 capsule by mouth Daily., Disp: , Rfl:     rOPINIRole (REQUIP) 3 MG tablet, Take 1 tablet by mouth Every Night., Disp: , Rfl:     atorvastatin (LIPITOR) 10 MG tablet, Take 1 tablet by mouth Daily., Disp: 30 tablet,  "Rfl: 11    bumetanide (BUMEX) 1 MG tablet, Take 1 tablet by mouth Daily As Needed (leg swelling)., Disp: 30 tablet, Rfl: 4    mupirocin (BACTROBAN) 2 % ointment, Apply 1 Application topically to the appropriate area as directed 3 (Three) Times a Day., Disp: , Rfl:     Past Medical History:   Diagnosis Date    Alcoholism     Anxiety     Chronic pain disorder     Depression     Hep C w/o coma, chronic     Substance abuse     Suicidal thoughts     Suicide attempt     reports hx of overdose x 2 and attempt to hang self- years ago    Withdrawal symptoms, drug or narcotic      Past Surgical History:   Procedure Laterality Date    BACK SURGERY      CHOLECYSTECTOMY      HYSTERECTOMY       Family History   Problem Relation Age of Onset    Schizophrenia Sister     Depression Mother     No Known Problems Father      Social History     Tobacco Use    Smoking status: Never    Smokeless tobacco: Never   Vaping Use    Vaping status: Never Used   Substance Use Topics    Alcohol use: Not Currently     Comment: Denies current use    Drug use: Yes     Frequency: 4.0 times per week     Types: Hydrocodone, Methamphetamines, Cocaine(coke)     Comment: IV meth last used 11/16/24.       Objective   Blood pressure 118/79, pulse 67, resp. rate 16, height 167.6 cm (66\"), weight 109 kg (240 lb), SpO2 98%.  Body mass index is 38.74 kg/m².    Constitutional:       Appearance: Not in distress.   Pulmonary:      Effort: Pulmonary effort is normal.      Breath sounds: Normal breath sounds.   Cardiovascular:      Normal rate. Regular rhythm. Normal S1. Normal S2.       Murmurs: There is no murmur.   Edema:     Peripheral edema present.     Pretibial: bilateral 2+ edema of the pretibial area.  Skin:     General: Skin is warm.   Neurological:      General: No focal deficit present.           DATA:   Results for orders placed during the hospital encounter of 11/17/24    Adult Transthoracic Echo Complete W/ Cont if Necessary Per " "Protocol    Interpretation Summary    Left ventricular systolic function is normal. Estimated left ventricular EF = 65%    Left ventricular diastolic function is consistent with (grade II w/high LAP) pseudonormalization.    Normal right ventricular cavity size and systolic function noted.    Trace mitral valve regurgitation is present.    The aortic valve appears trileaflet. Trace aortic valve regurgitation is present. No aortic valve stenosis is present.    There is no evidence of pericardial effusion.          No results found for: \"BNP\"  No results found for: \"PSA\"   Lab Results   Component Value Date    MG 2.2 11/19/2024     No results found for: \"INR\"  No results found for: \"CKTOTAL\"  Lab Results   Component Value Date    CHOL 152 11/18/2024     Lab Results   Component Value Date    TRIG 98 11/18/2024     Lab Results   Component Value Date    HDL 57 11/18/2024     Lab Results   Component Value Date    LDL 77 11/18/2024     No components found for: \"A1C\"      Lab Results   Component Value Date    TSH 5.460 (H) 11/17/2024             Invalid input(s): \"LABALBU\", \"PROT\"        Results review: During today's encounter, all relevant clinical data has been reviewed.      Procedures    Assessment & Plan    Diagnosis Plan   1. New onset a-fib  apixaban (ELIQUIS) 5 MG tablet tablet    atorvastatin (LIPITOR) 10 MG tablet      2. Carotid atherosclerosis, right  apixaban (ELIQUIS) 5 MG tablet tablet    atorvastatin (LIPITOR) 10 MG tablet      3. NSTEMI (non-ST elevated myocardial infarction)  Stress Test With Myocardial Perfusion One Day      4. Chronic heart failure with preserved ejection fraction (HFpEF)  bumetanide (BUMEX) 1 MG tablet      5. At risk for obstructive sleep apnea  Home Sleep Study    Ambulatory Referral to Sleep Medicine      6. Snoring            Recommendations  Orders Placed This Encounter   Procedures    Ambulatory Referral to Sleep Medicine    Stress Test With Myocardial Perfusion One Day    Home " Sleep Study        For history of atrial fibrillation, will continue Eliquis 5 mg p.o. twice daily.  Heart rate in today's visit is 67 bpm.  Will hold off on AV mark blockers.   Given the history of NSTEMI type II during the hospitalization, we will get pharmacologic stress test.  Patient denied having any history of seizures.  Given the history of chronic HFpEF, will start patient on Bumex 1 mg p.o. once daily.  Will consider starting on Jardiance next visit.  Given history of carotid atherosclerosis, will start patient on Lipitor 10 mg p.o. once daily.  Last LDL was 77 on 11/18/2024.  Goal for her is less than 70.  The underlying cause of her atrial fibrillation is likely untreated obstructive sleep apnea, will order a home sleep study and refer patient to sleep medicine.        New Medications:   New Medications Ordered This Visit   Medications    apixaban (ELIQUIS) 5 MG tablet tablet     Sig: Take 1 tablet by mouth 2 (Two) Times a Day. Indications: Atrial Fibrillation     Dispense:  60 tablet     Refill:  3    atorvastatin (LIPITOR) 10 MG tablet     Sig: Take 1 tablet by mouth Daily.     Dispense:  30 tablet     Refill:  11    bumetanide (BUMEX) 1 MG tablet     Sig: Take 1 tablet by mouth Daily As Needed (leg swelling).     Dispense:  30 tablet     Refill:  4       Discontinued Medications:   Medications Discontinued During This Encounter   Medication Reason    apixaban (ELIQUIS) 5 MG tablet tablet Reorder        Return in about 6 weeks (around 1/15/2025).      Thank you for allowing me to participate in the care of Brittaney Park. Feel free to contact me directly with any further questions or concerns.      This document has been electronically signed by Bacilio Solis MD   December 5, 2024 09:03 EST    Dictated Utilizing Dragon Dictation: Part of this note may be an electronic transcription/translation of spoken language to printed text using the Dragon Dictation System.

## 2024-12-10 ENCOUNTER — READMISSION MANAGEMENT (OUTPATIENT)
Dept: CALL CENTER | Facility: HOSPITAL | Age: 64
End: 2024-12-10
Payer: COMMERCIAL

## 2024-12-10 NOTE — OUTREACH NOTE
Medical Week 3 Survey      Flowsheet Row Responses   [facility] patient discharged from? [facility]   Does the patient have one of the following disease processes/diagnoses(primary or secondary)? Other   Week 3 attempt successful? Yes   Call start time 1352   Call end time 1353   Discharge diagnosis New onset a-fib   Person spoke with today (if not patient) and relationship patient   Meds reviewed with patient/caregiver? Yes   Is the patient taking all medications as directed (includes completed medication regime)? Yes   Comments regarding appointments patient is going to wound care for leg   Does the patient have a primary care provider?  Yes   Comments regarding PCP MANISHA Mazariegos   Does the patient have an appointment with their PCP within 7 days of discharge? Yes   Has the patient kept scheduled appointments due by today? Yes   Psychosocial issues? No   Did the patient receive a copy of their discharge instructions? Yes   Nursing interventions Reviewed instructions with patient   What is the patient's perception of their health status since discharge? Improving   Is the patient/caregiver able to teach back the hierarchy of who to call/visit for symptoms/problems? PCP, Specialist, Home health nurse, Urgent Care, ED, 911 Yes   If the patient is a current smoker, are they able to teach back resources for cessation? Not a smoker   Week 3 Call Completed? Yes   Graduated Yes   Is the patient interested in additional calls from an ambulatory ? No   Would this patient benefit from a Referral to Amb Social Work? No   Wrap up additional comments Patient doing well.  She denies needs.   Call end time 1353            HAKEEM HUGHES - Registered Nurse

## 2024-12-26 ENCOUNTER — TELEPHONE (OUTPATIENT)
Dept: PULMONOLOGY | Facility: CLINIC | Age: 64
End: 2024-12-26
Payer: COMMERCIAL

## 2024-12-26 NOTE — TELEPHONE ENCOUNTER
Has the patient been seen in the last 6 months? PATIENT NP APPOINTMENT ON 1/30/25    If the patient has not been seen and the machine is broken, schedule an appointment and alert the practice    Current issue/concern with equipment: PATIENT IS NOT CURRENTLY ON ANY MACHINES     DME company: [employer]    What kind of mask type (full or nasal)? N/A    Are you on a modem or chip? N/A    Additional information: PATIENT STATES SHE WAS SUPPOSED TO BE RECEIVING  A CPAP MACHINE AFTER SPEAKING TO SOMEONE AND SHE HAS NOT HEARD ANYTHING ELSE ABOUT IT. INFORMED PATIENT SHE MAY HAVE TO WAIT TILL HER INITIAL VISIT BUT PATIENT STATES SOMEONE HAD INFORMED HER THE EQUIPMENT WAS BEING SENT ALREADY BUT SHE IS UNSURE WHO THE DME COMPANY MAY HAVE BEEN THAT CONTACTED HER. PATIENT STATES ANY TIME IS OKAY TO CALL, VOICE MAILS NOT ACCEPTED. PLEASE ADVISE.

## 2025-01-14 ENCOUNTER — HOSPITAL ENCOUNTER (OUTPATIENT)
Dept: CARDIOLOGY | Facility: HOSPITAL | Age: 65
Discharge: HOME | End: 2025-01-14
Payer: COMMERCIAL

## 2025-01-14 ENCOUNTER — HOSPITAL ENCOUNTER (OUTPATIENT)
Dept: NUCLEAR MEDICINE | Facility: HOSPITAL | Age: 65
Discharge: HOME | End: 2025-01-14
Payer: COMMERCIAL

## 2025-01-14 DIAGNOSIS — I21.4 NSTEMI (NON-ST ELEVATED MYOCARDIAL INFARCTION): ICD-10-CM

## 2025-01-14 LAB
BH CV NUCLEAR PRIOR STUDY: 3
BH CV REST NUCLEAR ISOTOPE DOSE: 9.5 MCI
BH CV STRESS BP STAGE 1: NORMAL
BH CV STRESS COMMENTS STAGE 1: NORMAL
BH CV STRESS DOSE REGADENOSON STAGE 1: 0.4
BH CV STRESS DURATION MIN STAGE 1: 0
BH CV STRESS DURATION SEC STAGE 1: 10
BH CV STRESS HR STAGE 1: 75
BH CV STRESS NUCLEAR ISOTOPE DOSE: 28.7 MCI
BH CV STRESS PROTOCOL 1: NORMAL
BH CV STRESS RECOVERY BP: NORMAL MMHG
BH CV STRESS RECOVERY HR: 75 BPM
BH CV STRESS STAGE 1: 1
MAXIMAL PREDICTED HEART RATE: 158 BPM
PERCENT MAX PREDICTED HR: 47.47 %
SPECT HRT GATED+EF W RNC IV: 73 %
STRESS BASELINE BP: NORMAL MMHG
STRESS BASELINE HR: 56 BPM
STRESS PERCENT HR: 56 %
STRESS POST PEAK BP: NORMAL MMHG
STRESS POST PEAK HR: 75 BPM
STRESS TARGET HR: 134 BPM

## 2025-01-14 PROCEDURE — 34310000005 TECHNETIUM SESTAMIBI: Performed by: INTERNAL MEDICINE

## 2025-01-14 PROCEDURE — 78452 HT MUSCLE IMAGE SPECT MULT: CPT

## 2025-01-14 PROCEDURE — 25010000002 REGADENOSON 0.4 MG/5ML SOLUTION: Performed by: INTERNAL MEDICINE

## 2025-01-14 PROCEDURE — A9500 TC99M SESTAMIBI: HCPCS | Performed by: INTERNAL MEDICINE

## 2025-01-14 PROCEDURE — 93017 CV STRESS TEST TRACING ONLY: CPT

## 2025-01-14 RX ORDER — REGADENOSON 0.08 MG/ML
0.4 INJECTION, SOLUTION INTRAVENOUS
Status: COMPLETED | OUTPATIENT
Start: 2025-01-14 | End: 2025-01-14

## 2025-01-14 RX ADMIN — REGADENOSON 0.4 MG: 0.08 INJECTION, SOLUTION INTRAVENOUS at 10:08

## 2025-01-14 RX ADMIN — TECHNETIUM TC 99M SESTAMIBI 1 DOSE: 1 INJECTION INTRAVENOUS at 10:08

## 2025-01-14 RX ADMIN — TECHNETIUM TC 99M SESTAMIBI 1 DOSE: 1 INJECTION INTRAVENOUS at 09:04

## 2025-01-23 ENCOUNTER — TELEPHONE (OUTPATIENT)
Dept: CARDIOLOGY | Facility: CLINIC | Age: 65
End: 2025-01-23
Payer: COMMERCIAL

## 2025-01-23 DIAGNOSIS — G47.33 OSA (OBSTRUCTIVE SLEEP APNEA): Primary | ICD-10-CM

## 2025-01-23 DIAGNOSIS — Z91.89 AT RISK FOR OBSTRUCTIVE SLEEP APNEA: ICD-10-CM

## 2025-01-23 NOTE — TELEPHONE ENCOUNTER
Received the results of patient's sleep study.  Sleep study is showing mild obstructive sleep apnea.  Patient is currently scheduled to see Dr. Kramer on 1/30/2025.  Will defer to pulmonology for further management of the sleep apnea.

## 2025-01-30 ENCOUNTER — OFFICE VISIT (OUTPATIENT)
Dept: PULMONOLOGY | Facility: CLINIC | Age: 65
End: 2025-01-30
Payer: COMMERCIAL

## 2025-01-30 ENCOUNTER — OFFICE VISIT (OUTPATIENT)
Dept: CARDIOLOGY | Facility: CLINIC | Age: 65
End: 2025-01-30
Payer: COMMERCIAL

## 2025-01-30 VITALS
OXYGEN SATURATION: 87 % | DIASTOLIC BLOOD PRESSURE: 82 MMHG | SYSTOLIC BLOOD PRESSURE: 128 MMHG | HEIGHT: 66 IN | WEIGHT: 238 LBS | BODY MASS INDEX: 38.25 KG/M2 | HEART RATE: 79 BPM | TEMPERATURE: 97.4 F

## 2025-01-30 VITALS
SYSTOLIC BLOOD PRESSURE: 111 MMHG | OXYGEN SATURATION: 100 % | WEIGHT: 237 LBS | BODY MASS INDEX: 38.09 KG/M2 | HEART RATE: 61 BPM | DIASTOLIC BLOOD PRESSURE: 60 MMHG | HEIGHT: 66 IN

## 2025-01-30 DIAGNOSIS — E66.9 OBESITY (BMI 30-39.9): ICD-10-CM

## 2025-01-30 DIAGNOSIS — I48.91 NEW ONSET A-FIB: ICD-10-CM

## 2025-01-30 DIAGNOSIS — I65.21 CAROTID ATHEROSCLEROSIS, RIGHT: ICD-10-CM

## 2025-01-30 DIAGNOSIS — R06.02 SOB (SHORTNESS OF BREATH): ICD-10-CM

## 2025-01-30 DIAGNOSIS — I50.32 CHRONIC HEART FAILURE WITH PRESERVED EJECTION FRACTION (HFPEF): ICD-10-CM

## 2025-01-30 DIAGNOSIS — G47.33 OSA (OBSTRUCTIVE SLEEP APNEA): Primary | ICD-10-CM

## 2025-01-30 RX ORDER — ATORVASTATIN CALCIUM 10 MG/1
10 TABLET, FILM COATED ORAL DAILY
Qty: 30 TABLET | Refills: 11 | Status: SHIPPED | OUTPATIENT
Start: 2025-01-30

## 2025-01-30 NOTE — PROGRESS NOTES
Nancy Park presents for the following Sleep Apnea (Completed HST in media )  .    History of Present Illness   Were you born premature?  no    Any Childhood infections? no      Breathing problems when you were a child? no    Any childhood allergies?    no             At what age did you begin smoking? NA    Smoking marijuana? no    Any IV drugs? yes    How many packs per day? 0    Lung Function Test? yes  Chest X-Ray? yes    CT Chest? no Allergy Test? no    Family hx of Lung disease or Lung Cancer?no    If FHx is posivitive for lung cancer, what is the relationship of the family member? NA    Any hospitalization in the last year? yes    How far can you walk without getting short of breath? 25 feet    Any coughing? no    Any wheezing? no    Acid Reflux? yes    Do you snore? yes    Daytime Fatigue? yes    Any pets? yes   Any pet allergies? 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 no    What inhalers are you currently using? NA  Above-mentioned questionnaire was reviewed by me in great detail and discussed with patient  Review of Systems  Positive for daytime fatigue tiredness and shortness of breath  Active Problems:  Problem List Items Addressed This Visit          Cardiac and Vasculature    New onset a-fib       Endocrine and Metabolic    Obesity (BMI 30-39.9)       Pulmonary and Pneumonias    SOB (shortness of breath)    Relevant Orders    Complete PFT - Pre & Post Bronchodilator    XR Chest 2 View       Sleep    JEWELL (obstructive sleep apnea) - Primary    Relevant Orders    PAP Therapy       Past Medical History:  Past Medical History:   Diagnosis Date    Alcoholism     Anxiety     Chronic pain disorder     Depression     Hep C w/o coma, chronic     Substance abuse     Suicidal thoughts     Suicide attempt     reports hx of overdose x 2 and attempt to hang self- years ago    Withdrawal symptoms, drug or narcotic        Family History:  Family  "History   Problem Relation Age of Onset    Schizophrenia Sister     Depression Mother     No Known Problems Father        Social History:  Social History     Tobacco Use    Smoking status: Never     Passive exposure: Never    Smokeless tobacco: Never   Substance Use Topics    Alcohol use: Not Currently     Comment: Denies current use       Current Medications:  Current Outpatient Medications   Medication Sig Dispense Refill    apixaban (ELIQUIS) 5 MG tablet tablet Take 1 tablet by mouth 2 (Two) Times a Day. Indications: Atrial Fibrillation 60 tablet 3    ARIPiprazole (Abilify) 20 MG tablet Take 1 tablet by mouth Daily.      atorvastatin (LIPITOR) 10 MG tablet Take 1 tablet by mouth Daily. 30 tablet 11    bumetanide (BUMEX) 1 MG tablet Take 1 tablet by mouth Daily As Needed (leg swelling). 30 tablet 4    buprenorphine-naloxone (SUBOXONE) 8-2 MG per SL tablet Place 1 tablet under the tongue 2 (Two) Times a Day.      buPROPion XL (WELLBUTRIN XL) 150 MG 24 hr tablet Take 2 tablets by mouth Daily.      gabapentin (NEURONTIN) 300 MG capsule Take 1 capsule by mouth 3 (Three) Times a Day As Needed (nerve pain).      mupirocin (BACTROBAN) 2 % ointment Apply 1 Application topically to the appropriate area as directed 3 (Three) Times a Day.      omeprazole (priLOSEC) 20 MG capsule Take 1 capsule by mouth Daily.      rOPINIRole (REQUIP) 3 MG tablet Take 1 tablet by mouth Every Night.       No current facility-administered medications for this visit.       Allergies:  No Known Allergies    Vitals:  /82   Pulse 79   Temp 97.4 °F (36.3 °C)   Ht 167.6 cm (66\")   Wt 108 kg (238 lb)   SpO2 (!) 87%   BMI 38.41 kg/m²     Imaging:    Imaging Results (Most Recent)       None            Pulmonary Functions Testing Results:    No results found for: \"FEV1\", \"FVC\", \"OPR2EAB\", \"TLC\", \"DLCO\"    Results for orders placed or performed during the hospital encounter of 01/14/25   Stress Test With Myocardial Perfusion One Day    " Collection Time: 01/14/25 11:19 AM   Result Value Ref Range    Nuclear Prior Study 3.0     BH CV REST NUCLEAR ISOTOPE DOSE 9.5 mCi    BH CV STRESS NUCLEAR ISOTOPE DOSE 28.7 mCi    BH CV STRESS PROTOCOL 1 Pharmacologic     Stage 1 1.0     HR Stage 1 75     BP Stage 1 121/64     Duration Min Stage 1 0     Duration Sec Stage 1 10     Stress Dose Regadenoson Stage 1 0.40     Stress Comments Stage 1 10 sec bolus injection     Baseline HR 56 bpm    Baseline /58 mmHg    Peak HR 75 bpm    Peak /64 mmHg    Recovery HR 75 bpm    Recovery /59 mmHg    Target HR (85%) 134 bpm    Max. Pred. HR (100%) 158 bpm    Percent Max Pred HR 47.47 %    Percent Target HR 56 %    Nuc Stress EF 73 %       Objective   Physical Exam  General-morbidly obese in appearance, not in any acute distress    HEENT- pupils equally reactive to light, normal in size, no scleral icterus    Neck-supple    Respiratory-respirations normal-on auscultation no wheezing no crackles, decreased breath sounds    Cardiovascular-  Normal S1 and S2. No S3, S4 or murmurs. No JVD, no carotid bruit and no edema, pulses normal bilaterally     GI-nontender nondistended bowel sounds positive    CNS-nonfocal    Musculoskeletal -no edema  Extremities- no obvious deformity noticed     Psychiatric-mood good, good eye contact, alert awake oriented  Skin- no visible rash       Radiology Images  Study Result    Narrative & Impression   EXAM:    XR Chest, 1 View     EXAM DATE:    11/17/2024 7:51 PM     CLINICAL HISTORY:    a fib new onset     TECHNIQUE:    Frontal view of the chest.     COMPARISON:    No relevant prior studies available.     FINDINGS:    Lungs and pleural spaces:  See below.      Heart:  Cardiomegaly and pulmonary vascular congestion.    Mediastinum:  Unremarkable as visualized.    Bones/joints:  Unremarkable as visualized.     IMPRESSION:    Cardiomegaly and pulmonary vascular congestion.        Assessment & Plan   JEWELL-sleep study reviewed discussed  with patient.  Will start on PAP treatment.  was educated about ill health effects of sleep apnea and what all effects it can have on health in long-term.    Which includes blood clots, arrhythmias, stroke, depression, hypothyroidism. was also educated about the pathophysiology of sleep apnea and how weight contributes in it.    Patient understood the conversation well and will try and do exercise and eat right kind of food to lose weight.     obesity-did diet and exercise counseling.  Educated her about how diet plays a major role.  Also educated about CPAP treatment can help her lose some weight.    Shortness of breath-likely multifactorial related to patient's body habitus and deconditioning.  Will get PFTs and treat accordingly.  Will repeat chest x-ray as the last one showed fluid overload.    Atrial fibrillation-likely related to sleep apnea.  Patient was educated about it.  Continue management as per cardiology.  Sleep study done will start her on CPAP treatment.    Currently vaccinated    ICD-10-CM ICD-9-CM   1. JEWELL (obstructive sleep apnea)  G47.33 327.23   2. SOB (shortness of breath)  R06.02 786.05   3. Obesity (BMI 30-39.9)  E66.9 278.00   4. New onset a-fib  I48.91 427.31       Return in about 3 months (around 4/30/2025).

## 2025-01-30 NOTE — PROGRESS NOTES
Pura Mazariegos, APRN  No ref. provider found    Brittaney Park  1960 12/04/2024    Subjective     Brittaney Park is a 64 y.o. female who presents today to [facility] CARDIOLOGY for Follow-up (results).    History of Present Illness:    64-year-old female with history of major depressive disorder, substance use disorder in remission comes in today for hospital follow-up visit and to establish care.     Patient reports that since she has been out of the hospital she went on Suboxone and has been staying sober.  She continues to be on Eliquis 5 mg p.o. twice daily for the episode of A-fib.  She denies having any high risk bleeding.  Was previously taking Lasix 20 mg p.o. twice daily.  Was given a dose of Bumex in the hospital to which she responded really well.  During the hospitalization she was noted to have borderline elevated troponin likely NSTEMI type II.  Patient also reports snoring at night and having apneic spells.  She does report having orthopnea as well.     Today visit 1/30/2025  Feels much better.  Denies having any specific complaints or concerns.    No Known Allergies:    Current Outpatient Medications:     apixaban (ELIQUIS) 5 MG tablet tablet, Take 1 tablet by mouth 2 (Two) Times a Day. Indications: Atrial Fibrillation, Disp: 60 tablet, Rfl: 3    ARIPiprazole (Abilify) 20 MG tablet, Take 1 tablet by mouth Daily., Disp: , Rfl:     atorvastatin (LIPITOR) 10 MG tablet, Take 1 tablet by mouth Daily., Disp: 30 tablet, Rfl: 11    bumetanide (BUMEX) 1 MG tablet, Take 1 tablet by mouth Daily As Needed (leg swelling)., Disp: 30 tablet, Rfl: 4    buprenorphine-naloxone (SUBOXONE) 8-2 MG per SL tablet, Place 1 tablet under the tongue 2 (Two) Times a Day., Disp: , Rfl:     buPROPion XL (WELLBUTRIN XL) 150 MG 24 hr tablet, Take 2 tablets by mouth Daily., Disp: , Rfl:     gabapentin (NEURONTIN) 300 MG capsule, Take 1 capsule by mouth 3 (Three) Times a Day As Needed (nerve pain).,  "Disp: , Rfl:     mupirocin (BACTROBAN) 2 % ointment, Apply 1 Application topically to the appropriate area as directed 3 (Three) Times a Day., Disp: , Rfl:     omeprazole (priLOSEC) 20 MG capsule, Take 1 capsule by mouth Daily., Disp: , Rfl:     rOPINIRole (REQUIP) 3 MG tablet, Take 1 tablet by mouth Every Night., Disp: , Rfl:     Past Medical History:   Diagnosis Date    Alcoholism     Anxiety     Chronic pain disorder     Depression     Hep C w/o coma, chronic     Substance abuse     Suicidal thoughts     Suicide attempt     reports hx of overdose x 2 and attempt to hang self- years ago    Withdrawal symptoms, drug or narcotic      Past Surgical History:   Procedure Laterality Date    BACK SURGERY      CHOLECYSTECTOMY      HYSTERECTOMY       Family History   Problem Relation Age of Onset    Schizophrenia Sister     Depression Mother     No Known Problems Father      Social History     Tobacco Use    Smoking status: Never     Passive exposure: Never    Smokeless tobacco: Never   Vaping Use    Vaping status: Never Used   Substance Use Topics    Alcohol use: Not Currently     Comment: Denies current use    Drug use: Yes     Frequency: 4.0 times per week     Types: Hydrocodone, Methamphetamines, Cocaine(coke)     Comment: IV meth last used 11/16/24.       Objective   Blood pressure 111/60, pulse 61, height 167.6 cm (65.98\"), weight 108 kg (237 lb), SpO2 100%.  Body mass index is 38.27 kg/m².    Constitutional:       Appearance: Not in distress.   Pulmonary:      Effort: Pulmonary effort is normal.      Breath sounds: Normal breath sounds.   Cardiovascular:      Normal rate. Regular rhythm. Normal S1. Normal S2.       Murmurs: There is no murmur.   Edema:     Peripheral edema absent.   Skin:     General: Skin is warm.   Neurological:      General: No focal deficit present.           DATA:   Results for orders placed during the hospital encounter of 11/17/24    Adult Transthoracic Echo Complete W/ Cont if Necessary Per " "Protocol    Interpretation Summary    Left ventricular systolic function is normal. Estimated left ventricular EF = 65%    Left ventricular diastolic function is consistent with (grade II w/high LAP) pseudonormalization.    Normal right ventricular cavity size and systolic function noted.    Trace mitral valve regurgitation is present.    The aortic valve appears trileaflet. Trace aortic valve regurgitation is present. No aortic valve stenosis is present.    There is no evidence of pericardial effusion.   Results for orders placed during the hospital encounter of 01/14/25    Stress Test With Myocardial Perfusion One Day    Interpretation Summary  Images from the original result were not included.      Myocardial perfusion imaging indicates a normal myocardial perfusion study with no evidence of ischemia. Impressions are consistent with a low risk study.    Left ventricular ejection fraction is hyperdynamic (Calculated EF > 70%). TID 1.2    Breast attenuation artifact is present.         No results found for: \"BNP\"  No results found for: \"PSA\"   Lab Results   Component Value Date    MG 2.2 11/19/2024     No results found for: \"INR\"  No results found for: \"CKTOTAL\"  Lab Results   Component Value Date    CHOL 152 11/18/2024     Lab Results   Component Value Date    TRIG 98 11/18/2024     Lab Results   Component Value Date    HDL 57 11/18/2024     Lab Results   Component Value Date    LDL 77 11/18/2024     No components found for: \"A1C\"      Lab Results   Component Value Date    TSH 5.460 (H) 11/17/2024             Invalid input(s): \"LABALBU\", \"PROT\"        Results review: During today's encounter, all relevant clinical data has been reviewed.      Procedures    Assessment & Plan    Diagnosis Plan   1. New onset a-fib  apixaban (ELIQUIS) 5 MG tablet tablet    atorvastatin (LIPITOR) 10 MG tablet      2. Carotid atherosclerosis, right  apixaban (ELIQUIS) 5 MG tablet tablet    atorvastatin (LIPITOR) 10 MG tablet      3. " Chronic heart failure with preserved ejection fraction (HFpEF)              Recommendations  No orders of the defined types were placed in this encounter.       For history of atrial fibrillation, will continue Eliquis 5 mg p.o. twice daily.  Heart rate in today's visit is 61 bpm.  Will hold off on AV mark blockers.   Given the history of chronic HFpEF, will continue patient on Bumex 1 mg p.o. once daily.   Given history of carotid atherosclerosis, will continue patient on Lipitor 10 mg p.o. once daily.  Last LDL was 77 on 11/18/2024.  Goal for her is less than 70.  Sleep study showing mild sleep apnea.  Patient is waiting to see pulmonology.  Will hopefully benefit from sleep apnea therapy.         New Medications:   New Medications Ordered This Visit   Medications    apixaban (ELIQUIS) 5 MG tablet tablet     Sig: Take 1 tablet by mouth 2 (Two) Times a Day. Indications: Atrial Fibrillation     Dispense:  60 tablet     Refill:  3    atorvastatin (LIPITOR) 10 MG tablet     Sig: Take 1 tablet by mouth Daily.     Dispense:  30 tablet     Refill:  11       Discontinued Medications:   Medications Discontinued During This Encounter   Medication Reason    apixaban (ELIQUIS) 5 MG tablet tablet Reorder    atorvastatin (LIPITOR) 10 MG tablet Reorder          Return in about 6 months (around 7/30/2025).      Thank you for allowing me to participate in the care of Brittaney Park. Feel free to contact me directly with any further questions or concerns.      This document has been electronically signed by Bacilio Solis MD   January 30, 2025 14:49 EST    Dictated Utilizing Dragon Dictation: Part of this note may be an electronic transcription/translation of spoken language to printed text using the Dragon Dictation System.